# Patient Record
Sex: FEMALE | Race: WHITE | NOT HISPANIC OR LATINO | URBAN - METROPOLITAN AREA
[De-identification: names, ages, dates, MRNs, and addresses within clinical notes are randomized per-mention and may not be internally consistent; named-entity substitution may affect disease eponyms.]

---

## 2020-08-16 ENCOUNTER — OUTPATIENT (OUTPATIENT)
Dept: OUTPATIENT SERVICES | Facility: HOSPITAL | Age: 70
LOS: 1 days | Discharge: HOME | End: 2020-08-16

## 2020-08-16 DIAGNOSIS — Z11.59 ENCOUNTER FOR SCREENING FOR OTHER VIRAL DISEASES: ICD-10-CM

## 2020-08-19 ENCOUNTER — OUTPATIENT (OUTPATIENT)
Dept: OUTPATIENT SERVICES | Facility: HOSPITAL | Age: 70
LOS: 1 days | Discharge: HOME | End: 2020-08-19

## 2020-08-19 VITALS
HEIGHT: 68 IN | WEIGHT: 126.1 LBS | TEMPERATURE: 98 F | DIASTOLIC BLOOD PRESSURE: 74 MMHG | HEART RATE: 78 BPM | SYSTOLIC BLOOD PRESSURE: 146 MMHG | OXYGEN SATURATION: 96 % | RESPIRATION RATE: 17 BRPM

## 2020-08-19 VITALS — DIASTOLIC BLOOD PRESSURE: 70 MMHG | HEART RATE: 70 BPM | RESPIRATION RATE: 18 BRPM | SYSTOLIC BLOOD PRESSURE: 140 MMHG

## 2020-08-24 DIAGNOSIS — F19.19 OTHER PSYCHOACTIVE SUBSTANCE ABUSE WITH UNSPECIFIED PSYCHOACTIVE SUBSTANCE-INDUCED DISORDER: ICD-10-CM

## 2020-08-24 DIAGNOSIS — F17.210 NICOTINE DEPENDENCE, CIGARETTES, UNCOMPLICATED: ICD-10-CM

## 2020-08-24 DIAGNOSIS — H26.9 UNSPECIFIED CATARACT: ICD-10-CM

## 2020-08-24 DIAGNOSIS — H40.9 UNSPECIFIED GLAUCOMA: ICD-10-CM

## 2020-08-24 DIAGNOSIS — J44.9 CHRONIC OBSTRUCTIVE PULMONARY DISEASE, UNSPECIFIED: ICD-10-CM

## 2020-08-26 PROBLEM — H40.9 UNSPECIFIED GLAUCOMA: Chronic | Status: ACTIVE | Noted: 2020-08-19

## 2020-08-30 ENCOUNTER — OUTPATIENT (OUTPATIENT)
Dept: OUTPATIENT SERVICES | Facility: HOSPITAL | Age: 70
LOS: 1 days | Discharge: HOME | End: 2020-08-30

## 2020-08-30 DIAGNOSIS — Z11.59 ENCOUNTER FOR SCREENING FOR OTHER VIRAL DISEASES: ICD-10-CM

## 2020-09-02 ENCOUNTER — OUTPATIENT (OUTPATIENT)
Dept: OUTPATIENT SERVICES | Facility: HOSPITAL | Age: 70
LOS: 1 days | Discharge: HOME | End: 2020-09-02

## 2020-09-02 VITALS
HEART RATE: 70 BPM | TEMPERATURE: 97 F | DIASTOLIC BLOOD PRESSURE: 67 MMHG | WEIGHT: 125 LBS | SYSTOLIC BLOOD PRESSURE: 143 MMHG | OXYGEN SATURATION: 100 % | RESPIRATION RATE: 17 BRPM | HEIGHT: 68 IN

## 2020-09-02 VITALS — RESPIRATION RATE: 17 BRPM | DIASTOLIC BLOOD PRESSURE: 53 MMHG | SYSTOLIC BLOOD PRESSURE: 110 MMHG | HEART RATE: 65 BPM

## 2020-09-02 DIAGNOSIS — Z98.890 OTHER SPECIFIED POSTPROCEDURAL STATES: Chronic | ICD-10-CM

## 2020-09-02 NOTE — PRE-ANESTHESIA EVALUATION ADULT - NSANTHPMHFT_GEN_ALL_CORE
Chart reviewed, pt interviewed and examined. Chart reviewed, pt interviewed and examined.  Smoker, COPD, Mild cough +

## 2020-09-09 DIAGNOSIS — H40.9 UNSPECIFIED GLAUCOMA: ICD-10-CM

## 2020-09-09 DIAGNOSIS — H25.811 COMBINED FORMS OF AGE-RELATED CATARACT, RIGHT EYE: ICD-10-CM

## 2020-09-09 DIAGNOSIS — J44.9 CHRONIC OBSTRUCTIVE PULMONARY DISEASE, UNSPECIFIED: ICD-10-CM

## 2021-11-26 ENCOUNTER — OUTPATIENT (OUTPATIENT)
Dept: OUTPATIENT SERVICES | Facility: HOSPITAL | Age: 71
LOS: 1 days | Discharge: HOME | End: 2021-11-26
Payer: COMMERCIAL

## 2021-11-26 DIAGNOSIS — J06.9 ACUTE UPPER RESPIRATORY INFECTION, UNSPECIFIED: ICD-10-CM

## 2021-11-26 DIAGNOSIS — Z98.890 OTHER SPECIFIED POSTPROCEDURAL STATES: Chronic | ICD-10-CM

## 2021-11-26 PROCEDURE — 71045 X-RAY EXAM CHEST 1 VIEW: CPT | Mod: 26

## 2022-03-18 ENCOUNTER — EMERGENCY (EMERGENCY)
Facility: HOSPITAL | Age: 72
LOS: 0 days | Discharge: HOME | End: 2022-03-18
Attending: EMERGENCY MEDICINE | Admitting: EMERGENCY MEDICINE
Payer: COMMERCIAL

## 2022-03-18 VITALS
DIASTOLIC BLOOD PRESSURE: 74 MMHG | HEIGHT: 68 IN | RESPIRATION RATE: 17 BRPM | SYSTOLIC BLOOD PRESSURE: 174 MMHG | HEART RATE: 74 BPM | TEMPERATURE: 96 F | OXYGEN SATURATION: 97 %

## 2022-03-18 DIAGNOSIS — S39.012A STRAIN OF MUSCLE, FASCIA AND TENDON OF LOWER BACK, INITIAL ENCOUNTER: ICD-10-CM
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
## 2022-03-18 DIAGNOSIS — R10.32 LEFT LOWER QUADRANT PAIN: ICD-10-CM

## 2022-03-18 DIAGNOSIS — Z88.8 ALLERGY STATUS TO OTHER DRUGS, MEDICAMENTS AND BIOLOGICAL SUBSTANCES STATUS: ICD-10-CM

## 2022-03-18 DIAGNOSIS — F17.210 NICOTINE DEPENDENCE, CIGARETTES, UNCOMPLICATED: ICD-10-CM

## 2022-03-18 DIAGNOSIS — S76.212A STRAIN OF ADDUCTOR MUSCLE, FASCIA AND TENDON OF LEFT THIGH, INITIAL ENCOUNTER: ICD-10-CM
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
## 2022-03-18 DIAGNOSIS — Z98.890 OTHER SPECIFIED POSTPROCEDURAL STATES: Chronic | ICD-10-CM

## 2022-03-18 DIAGNOSIS — S76.012A STRAIN OF MUSCLE, FASCIA AND TENDON OF LEFT HIP, INITIAL ENCOUNTER: ICD-10-CM

## 2022-03-18 PROCEDURE — 73502 X-RAY EXAM HIP UNI 2-3 VIEWS: CPT | Mod: 26,LT

## 2022-03-18 PROCEDURE — 99284 EMERGENCY DEPT VISIT MOD MDM: CPT

## 2022-03-18 RX ORDER — LIDOCAINE 4 G/100G
1 CREAM TOPICAL ONCE
Refills: 0 | Status: COMPLETED | OUTPATIENT
Start: 2022-03-18 | End: 2022-03-18

## 2022-03-18 RX ORDER — LIDOCAINE 4 G/100G
1 CREAM TOPICAL
Qty: 10 | Refills: 0
Start: 2022-03-18 | End: 2022-03-22

## 2022-03-18 RX ORDER — METHOCARBAMOL 500 MG/1
1 TABLET, FILM COATED ORAL
Qty: 9 | Refills: 0
Start: 2022-03-18 | End: 2022-03-20

## 2022-03-18 RX ORDER — METHOCARBAMOL 500 MG/1
1500 TABLET, FILM COATED ORAL ONCE
Refills: 0 | Status: DISCONTINUED | OUTPATIENT
Start: 2022-03-18 | End: 2022-03-18

## 2022-03-18 RX ORDER — KETOROLAC TROMETHAMINE 30 MG/ML
30 SYRINGE (ML) INJECTION ONCE
Refills: 0 | Status: DISCONTINUED | OUTPATIENT
Start: 2022-03-18 | End: 2022-03-18

## 2022-03-18 RX ADMIN — LIDOCAINE 1 PATCH: 4 CREAM TOPICAL at 12:28

## 2022-03-18 RX ADMIN — Medication 30 MILLIGRAM(S): at 11:45

## 2022-03-18 RX ADMIN — Medication 30 MILLIGRAM(S): at 11:28

## 2022-03-18 NOTE — ED PROVIDER NOTE - NS ED ROS FT
Constitutional: (-) fever  Eyes/ENT: (-) visual changes   Cardiovascular: (-) chest pain, (-) syncope  Respiratory: (-) cough, (-) shortness of breath  Gastrointestinal: (-) vomiting, (-) diarrhea  Genitourinary: (-) dysuria, (-) hesitancy, (-) frequency   Musculoskeletal: (-) neck pain, (-) back pain, (-) joint pain  Integumentary: (-) rash, (-) edema  Neurological: (-) headache, (-) altered mental status  Allergic/Immunologic: (-) pruritus Constitutional: (-) fever  Eyes/ENT: (-) visual changes   Cardiovascular: (-) chest pain, (-) syncope  Respiratory: (-) cough, (-) shortness of breath  Gastrointestinal: (-) vomiting, (-) diarrhea  Genitourinary: (-) dysuria, (-) hesitancy, (-) frequency   Musculoskeletal: (-) neck pain, (-) back pain, (+) L groin pain  Integumentary: (-) rash, (-) edema  Neurological: (-) headache, (-) altered mental status  Allergic/Immunologic: (-) pruritus

## 2022-03-18 NOTE — ED PROVIDER NOTE - CLINICAL SUMMARY MEDICAL DECISION MAKING FREE TEXT BOX
73 yo F with L groin pain that started after bending over to  a paper. Pain started acutely and has persisted, exacerbated by ambulation better with rest. Exam as documented. FROM and NVI. XR negative Clinically with strain. Stable at [place].

## 2022-03-18 NOTE — ED PROVIDER NOTE - PATIENT PORTAL LINK FT
You can access the FollowMyHealth Patient Portal offered by [facility] by registering at the following website: [email or web address]. By joining [employer]’s FollowMyHealth portal, you will also be able to view your health information using other applications (apps) compatible with our system.

## 2022-03-18 NOTE — ED PROVIDER NOTE - OBJECTIVE STATEMENT
73 yo female with no pertinent pmh presents c/o L groin pain. pt states the pain started about 2hr prior to arrival after bending over to  a towel. pt denies any trauma/falls. pt describes the pain as sharp in nature that is non-radiating and aggravating with ambulation. denies any other symptoms including fevers, chill, headache, recent illness/travel, cough, urinary retention/incontinence, saddle anesthesias, abdominal pain, chest pain, or SOB.

## 2022-03-18 NOTE — ED PROVIDER NOTE - NS ED ATTENDING STATEMENT MOD
This was a shared visit with the MARTI. I reviewed and verified the documentation and independently performed the documented:

## 2022-03-18 NOTE — ED PROVIDER NOTE - NSFOLLOWUPINSTRUCTIONS_ED_ALL_ED_FT
Please follow up with your primary care physician within 24-72 hours and return immediately if symptoms worsen.    Muscle Pain, Adult  Muscle pain (myalgia) may be mild or severe. In most cases, the pain lasts only a short time and it goes away without treatment. It is normal to feel some muscle pain after starting a workout program. Muscles that have not been used often will be sore at first.    Muscle pain may also be caused by many other things, including:  Overuse or muscle strain, especially if you are not in shape. This is the most common cause of muscle pain.  Injury.  Bruises.  Viruses, such as the flu.  Infectious diseases.  A chronic condition that causes muscle tenderness, fatigue, and headache (fibromyalgia).  A condition, such as lupus, in which the body’s disease-fighting system attacks other organs in the body (autoimmune or rheumatologic diseases).  Certain drugs, including ACE inhibitors and statins.  To diagnose the cause of your muscle pain, your health care provider will do a physical exam and ask questions about the pain and when it began. If you have not had muscle pain for very long, your health care provider may want to wait before doing much testing. If your muscle pain has lasted a long time, your health care provider may want to run tests right away. In some cases, this may include tests to rule out certain conditions or illnesses.    Treatment for muscle pain depends on the cause. Home care is often enough to relieve muscle pain. Your health care provider may also prescribe anti-inflammatory medicine.    Follow these instructions at home:  Activity     If overuse is causing your muscle pain:  Slow down your activities until the pain goes away.  Do regular, gentle exercises if you are not usually active.  Warm up before exercising. Stretch before and after exercising. This can help lower the risk of muscle pain.  Do not continue working out if the pain is very bad. Bad pain could mean that you have injured a muscle.  Managing pain and discomfort     Image   If directed, apply ice to the sore muscle:  Put ice in a plastic bag.  Place a towel between your skin and the bag.  Leave the ice on for 20 minutes, 2–3 times a day.  You may also alternate between applying ice and applying heat as told by your health care provider. To apply heat, use the heat source that your health care provider recommends, such as a moist heat pack or a heating pad.  Place a towel between your skin and the heat source.  Leave the heat on for 20–30 minutes.  Remove the heat if your skin turns bright red. This is especially important if you are unable to feel pain, heat, or cold. You may have a greater risk of getting burned.  Medicines     Take over-the-counter and prescription medicines only as told by your health care provider.  Do not drive or use heavy machinery while taking prescription pain medicine.  Contact a health care provider if:  Your muscle pain gets worse and medicines do not help.  You have muscle pain that lasts longer than 3 days.  You have a rash or fever along with muscle pain.  You have muscle pain after a tick bite.  You have muscle pain while working out, even though you are in good physical condition.  You have redness, soreness, or swelling along with muscle pain.  You have muscle pain after starting a new medicine or changing the dose of a medicine.  Get help right away if:  You have trouble breathing.  You have trouble swallowing.  You have muscle pain along with a stiff neck, fever, and vomiting.  You have severe muscle weakness or cannot move part of your body.  This information is not intended to replace advice given to you by your health care provider. Make sure you discuss any questions you have with your health care provider.    Hip Pain    Your hip is the joint between your upper legs and your lower pelvis. The bones, cartilage, tendons, and muscles of your hip joint perform a lot of work each day supporting your body weight and allowing you to move around.    Hip pain can range from a minor ache to severe pain in one or both of your hips. Pain may be felt on the inside of the hip joint near the groin, or the outside near the buttocks and upper thigh. You may have swelling or stiffness as well.     HOME CARE INSTRUCTIONS  Take medicines only as directed by your health care provider.  Apply ice to the injured area:  Put ice in a plastic bag.  Place a towel between your skin and the bag.  Leave the ice on for 15–20 minutes at a time, 3–4 times a day.  Keep your leg raised (elevated) when possible to lessen swelling.  Avoid activities that cause pain.  Follow specific exercises as directed by your health care provider.  Sleep with a pillow between your legs on your most comfortable side.  Record how often you have hip pain, the location of the pain, and what it feels like.     SEEK MEDICAL CARE IF:  You are unable to put weight on your leg.  Your hip is red or swollen or very tender to touch.  Your pain or swelling continues or worsens after 1 week.  You have increasing difficulty walking.  You have a fever.    SEEK IMMEDIATE MEDICAL CARE IF:  You have fallen.  You have a sudden increase in pain and swelling in your hip.    MAKE SURE YOU:  Understand these instructions.  Will watch your condition.  Will get help right away if you are not doing well or get worse.    ADDITIONAL NOTES AND INSTRUCTIONS    Please follow up with your Primary MD in 24-48 hr.  Seek immediate medical care for any new/worsening signs or symptoms.

## 2022-03-18 NOTE — ED PROVIDER NOTE - CARE PROVIDER_API CALL
Werner Tipton (MD)  Orthopaedic Surgery  [address] [place] [address]  Phone: [phone]  Fax: [phone]  Follow Up Time: 1-3 Days

## 2022-03-18 NOTE — ED PROVIDER NOTE - ATTENDING CONTRIBUTION TO CARE
71 yo F with L groin pain that started after bending over to  a paper. Pain started acutely and has persisted, exacerbated by ambulation better with rest. Exam as documented. FROM and NVI. XR negative Clinically with strain. Stable at [place].

## 2022-03-18 NOTE — ED PROVIDER NOTE - PROGRESS NOTE DETAILS
Authored by Roseline Brenner, DO: 73 y/o F no reported PMHx presents to ED with left groin pain that started today after bending down to pick something up. Denies falls or head trauma. Pt felt "squeeze" in left lower back but currently denies back pain. Exam: well appearing, RRR, no wheezes rales or rhonchi, back without midline tenderness or stepoffs, +L lower lumbar paraspinal muscle TTP, no ecchymoses; TTP to L hip no ecchymoses, full ROM, distal pulses intact.     Plan: Pain control, XR, reassess.

## 2022-03-18 NOTE — ED PROVIDER NOTE - PHYSICAL EXAMINATION
Gen: NAD, AOx3  Head: NCAT  HEENT: PERRL, oral mucosa moist, normal conjunctiva, oropharynx clear without exudate or erythema  Lung: CTAB, no respiratory distress, no wheezing, rales, rhonchi  CV: normal s1/s2, rrr, Normal perfusion, pulses 2+ throughout  Abd: soft, NTND, no CVA tenderness  Genitourinary: no pelvic tenderness  MSK: No edema, no visible deformities, full range of motion in all 4 extremities, no spinal tenderness, no TTP of groin/hip   Neuro: CN II-XII grossly intact, No focal neurologic deficits  Skin: No rash/erythema/edema/lesions   Psych: normal affect

## 2022-09-07 ENCOUNTER — OUTPATIENT (OUTPATIENT)
Dept: OUTPATIENT SERVICES | Facility: HOSPITAL | Age: 72
LOS: 1 days | Discharge: HOME | End: 2022-09-07

## 2022-09-07 DIAGNOSIS — Z12.31 ENCOUNTER FOR SCREENING MAMMOGRAM FOR MALIGNANT NEOPLASM OF BREAST: ICD-10-CM

## 2022-09-07 DIAGNOSIS — Z98.890 OTHER SPECIFIED POSTPROCEDURAL STATES: Chronic | ICD-10-CM

## 2022-09-07 PROCEDURE — 77067 SCR MAMMO BI INCL CAD: CPT | Mod: 26

## 2022-09-07 PROCEDURE — 77063 BREAST TOMOSYNTHESIS BI: CPT | Mod: 26

## 2022-09-19 ENCOUNTER — INPATIENT (INPATIENT)
Facility: HOSPITAL | Age: 72
LOS: 1 days | Discharge: HOME | End: 2022-09-21
Attending: HOSPITALIST | Admitting: HOSPITALIST

## 2022-09-19 VITALS
HEIGHT: 68 IN | TEMPERATURE: 97 F | DIASTOLIC BLOOD PRESSURE: 71 MMHG | RESPIRATION RATE: 17 BRPM | OXYGEN SATURATION: 98 % | HEART RATE: 71 BPM | SYSTOLIC BLOOD PRESSURE: 146 MMHG | WEIGHT: 119.93 LBS

## 2022-09-19 DIAGNOSIS — Z98.890 OTHER SPECIFIED POSTPROCEDURAL STATES: Chronic | ICD-10-CM

## 2022-09-19 LAB
ALBUMIN SERPL ELPH-MCNC: 4.5 G/DL — SIGNIFICANT CHANGE UP (ref 3.5–5.2)
ALP SERPL-CCNC: 87 U/L — SIGNIFICANT CHANGE UP (ref 30–115)
ALT FLD-CCNC: 11 U/L — SIGNIFICANT CHANGE UP (ref 0–41)
ANION GAP SERPL CALC-SCNC: 10 MMOL/L — SIGNIFICANT CHANGE UP (ref 7–14)
AST SERPL-CCNC: 18 U/L — SIGNIFICANT CHANGE UP (ref 0–41)
BASOPHILS # BLD AUTO: 0.06 K/UL — SIGNIFICANT CHANGE UP (ref 0–0.2)
BASOPHILS NFR BLD AUTO: 0.9 % — SIGNIFICANT CHANGE UP (ref 0–1)
BILIRUB SERPL-MCNC: 0.3 MG/DL — SIGNIFICANT CHANGE UP (ref 0.2–1.2)
BUN SERPL-MCNC: 17 MG/DL — SIGNIFICANT CHANGE UP (ref 10–20)
CALCIUM SERPL-MCNC: 9.2 MG/DL — SIGNIFICANT CHANGE UP (ref 8.4–10.4)
CHLORIDE SERPL-SCNC: 106 MMOL/L — SIGNIFICANT CHANGE UP (ref 98–110)
CO2 SERPL-SCNC: 25 MMOL/L — SIGNIFICANT CHANGE UP (ref 17–32)
CREAT SERPL-MCNC: 0.8 MG/DL — SIGNIFICANT CHANGE UP (ref 0.7–1.5)
EGFR: 79 ML/MIN/1.73M2 — SIGNIFICANT CHANGE UP
EOSINOPHIL # BLD AUTO: 0.13 K/UL — SIGNIFICANT CHANGE UP (ref 0–0.7)
EOSINOPHIL NFR BLD AUTO: 1.9 % — SIGNIFICANT CHANGE UP (ref 0–8)
GLUCOSE SERPL-MCNC: 84 MG/DL — SIGNIFICANT CHANGE UP (ref 70–99)
HCT VFR BLD CALC: 42.1 % — SIGNIFICANT CHANGE UP (ref 37–47)
HGB BLD-MCNC: 14.1 G/DL — SIGNIFICANT CHANGE UP (ref 12–16)
IMM GRANULOCYTES NFR BLD AUTO: 0.3 % — SIGNIFICANT CHANGE UP (ref 0.1–0.3)
LYMPHOCYTES # BLD AUTO: 1.26 K/UL — SIGNIFICANT CHANGE UP (ref 1.2–3.4)
LYMPHOCYTES # BLD AUTO: 18.8 % — LOW (ref 20.5–51.1)
MAGNESIUM SERPL-MCNC: 2.1 MG/DL — SIGNIFICANT CHANGE UP (ref 1.8–2.4)
MCHC RBC-ENTMCNC: 29.9 PG — SIGNIFICANT CHANGE UP (ref 27–31)
MCHC RBC-ENTMCNC: 33.5 G/DL — SIGNIFICANT CHANGE UP (ref 32–37)
MCV RBC AUTO: 89.4 FL — SIGNIFICANT CHANGE UP (ref 81–99)
MONOCYTES # BLD AUTO: 0.75 K/UL — HIGH (ref 0.1–0.6)
MONOCYTES NFR BLD AUTO: 11.2 % — HIGH (ref 1.7–9.3)
NEUTROPHILS # BLD AUTO: 4.48 K/UL — SIGNIFICANT CHANGE UP (ref 1.4–6.5)
NEUTROPHILS NFR BLD AUTO: 66.9 % — SIGNIFICANT CHANGE UP (ref 42.2–75.2)
NRBC # BLD: 0 /100 WBCS — SIGNIFICANT CHANGE UP (ref 0–0)
PLATELET # BLD AUTO: 218 K/UL — SIGNIFICANT CHANGE UP (ref 130–400)
POTASSIUM SERPL-MCNC: 4.2 MMOL/L — SIGNIFICANT CHANGE UP (ref 3.5–5)
POTASSIUM SERPL-SCNC: 4.2 MMOL/L — SIGNIFICANT CHANGE UP (ref 3.5–5)
PROT SERPL-MCNC: 6.3 G/DL — SIGNIFICANT CHANGE UP (ref 6–8)
RBC # BLD: 4.71 M/UL — SIGNIFICANT CHANGE UP (ref 4.2–5.4)
RBC # FLD: 13.3 % — SIGNIFICANT CHANGE UP (ref 11.5–14.5)
SARS-COV-2 RNA SPEC QL NAA+PROBE: SIGNIFICANT CHANGE UP
SODIUM SERPL-SCNC: 141 MMOL/L — SIGNIFICANT CHANGE UP (ref 135–146)
TROPONIN T SERPL-MCNC: 0.01 NG/ML — SIGNIFICANT CHANGE UP
TROPONIN T SERPL-MCNC: <0.01 NG/ML — SIGNIFICANT CHANGE UP
WBC # BLD: 6.7 K/UL — SIGNIFICANT CHANGE UP (ref 4.8–10.8)
WBC # FLD AUTO: 6.7 K/UL — SIGNIFICANT CHANGE UP (ref 4.8–10.8)

## 2022-09-19 PROCEDURE — 71045 X-RAY EXAM CHEST 1 VIEW: CPT | Mod: 26

## 2022-09-19 PROCEDURE — 75574 CT ANGIO HRT W/3D IMAGE: CPT | Mod: 26,MA

## 2022-09-19 PROCEDURE — 99236 HOSP IP/OBS SAME DATE HI 85: CPT

## 2022-09-19 PROCEDURE — 93010 ELECTROCARDIOGRAM REPORT: CPT | Mod: 76

## 2022-09-19 RX ORDER — ASPIRIN/CALCIUM CARB/MAGNESIUM 324 MG
162 TABLET ORAL ONCE
Refills: 0 | Status: COMPLETED | OUTPATIENT
Start: 2022-09-19 | End: 2022-09-19

## 2022-09-19 RX ORDER — METOPROLOL TARTRATE 50 MG
100 TABLET ORAL ONCE
Refills: 0 | Status: DISCONTINUED | OUTPATIENT
Start: 2022-09-19 | End: 2022-09-19

## 2022-09-19 RX ORDER — METOPROLOL TARTRATE 50 MG
50 TABLET ORAL ONCE
Refills: 0 | Status: COMPLETED | OUTPATIENT
Start: 2022-09-19 | End: 2022-09-19

## 2022-09-19 RX ADMIN — Medication 162 MILLIGRAM(S): at 10:34

## 2022-09-19 RX ADMIN — Medication 50 MILLIGRAM(S): at 10:44

## 2022-09-19 NOTE — ED ADULT NURSE NOTE - OBJECTIVE STATEMENT
pt reports midsternal chest pain on and off from about one month. Pain got worse around 430 am but has subsided within 30 min of arrival

## 2022-09-19 NOTE — ED PROVIDER NOTE - OBJECTIVE STATEMENT
72-year-old female with no past medical history positive smoker presenting with midsternal chest pain radiating to neck and bilateral shoulders that started this morning around 4:30 AM.  Patient describes pain as burning and pressure-like lasting a few hours.  Patient states that she took 2 aspirin prior to arrival. Patient has been having this pain intermittently over the last month.  The last time she had this pain was Friday and was seen by her primary care doctor and had an EKG done and was sent home on [facility].  Patient has not had any cardiac testing recently. No recent travel, recent surgeries, leg pain, leg swelling, hormonal use, pleuritic chest pain, or history of blood clots. No sob, fever, chills, abdominal pain, nausea, vomiting, diarrhea, back pain, urinary symptoms, headache, dizziness, paresthesias, or weakness.

## 2022-09-19 NOTE — ED PROVIDER NOTE - WR ORDER ID 1
Detail Level: Zone
Initiate Treatment: Clobetasol cream twice daily to as needed. \\nWash clothes that was worn outdoors.\\nBenadryl at night.\\nAllegra in the morning.
[id number]

## 2022-09-19 NOTE — ED CDU PROVIDER INITIAL DAY NOTE - PHYSICAL EXAMINATION
CONST: Well appearing in NAD  EYES: PERRL, EOMI, Sclera and conjunctiva clear.   CARD: Normal S1 S2; Normal rate and rhythm  RESP: Equal BS B/L, No wheezes, rhonchi or rales. No distress  GI: Soft, non-tender, non-distended.  MS: Normal ROM in all extremities. No midline spinal tenderness.  SKIN: Warm, dry, no acute rashes. Good turgor  NEURO: A&Ox3, No focal deficits. Strength 5/5 with no sensory deficits. Steady gait

## 2022-09-19 NOTE — ED CDU PROVIDER INITIAL DAY NOTE - OBJECTIVE STATEMENT
73yo female active cigarette smoker presented to ED c/o mid-sternal chest burning radiating to throat and both shoulders, without any specific aggravating or relieving factors, not at a specific time of day. Pt was seen by her PMD 71yo female active cigarette smoker presented to ED c/o mid-sternal chest burning radiating to throat and both shoulders, without any specific aggravating or relieving factors, not at a specific time of day. Pt was seen by her PMD and encouraged to start Pecid of which she has done for the past 3 days without relief. She has not had any prior cardiac workup. No FHx CAD 73yo female active cigarette smoker presented to ED c/o mid-sternal chest burning radiating to throat and both shoulders, without any specific aggravating or relieving factors, not at a specific time of day for the past 3wks-1mth. Pt was seen by her PMD and encouraged to start Pepcid of which she has done for the past 3 days without relief. She has not had any prior cardiac workup. No FHx CAD

## 2022-09-19 NOTE — ED PROVIDER NOTE - NS ED ROS FT
Review of Systems:  	•	CONSTITUTIONAL - no fever, no diaphoresis, no chills  	•	SKIN - no rash  	•	HEMATOLOGIC - no bleeding, no bruising  	•	EYES - no eye pain, no blurry vision  	•	ENT - no congestion  	•	RESPIRATORY - no shortness of breath, no cough  	•	CARDIAC - + chest pain, no palpitations  	•	GI - no abd pain, no nausea, no vomiting, no diarrhea, no constipation  	•	GENITO-URINARY - no dysuria; no hematuria, no increased urinary frequency  	•	MUSCULOSKELETAL - +neck pain, +shoulder pain, no swelling, no redness  	•	NEUROLOGIC - no weakness, no headache, no paresthesias, no LOC  	•	PSYCH - no anxiety, no depression  	All other ROS are negative except as documented in HPI.

## 2022-09-19 NOTE — ED CDU PROVIDER DISPOSITION NOTE - CLINICAL COURSE
pt presented to ed for eval of chest pain. pt placed in edou under  chest pain protocol. pt with workup including labs wnl, including 2 sets of negative cardiac enzymes, 2 ekg with no ischemic changes, normal cxray, CCTA Cad Rad 4. The patient was admitted for further eval.

## 2022-09-19 NOTE — ED PROVIDER NOTE - CLINICAL SUMMARY MEDICAL DECISION MAKING FREE TEXT BOX
72-year-old female with history of spinal stenosis, smoker, presents with intermittent chest pressure x1 month, radiates to bilateral shoulders and neck intermittently, no exacerbating/alleviating factors, nonexertional, nonpleuritic. Seen by PMD and started on Prilosec, without improvement. Denies all other symptoms including shortness of breath, vomiting, dizziness, diaphoresis, cough, fever, leg pain or swelling, recent long distance travel. On exam, afebrile, hemodynamically stable, saturating well, NAD, well appearing, sitting comfortably in bed, no WOB, speaking full sentences, head NCAT, EOMI grossly, anicteric, MMM, no JVD, RRR, nml S1/S2, no m/r/g, lungs CTAB, no w/r/r, abd soft, NT, ND, nml BS, no rebound or guarding, AAO, CN's 3-12 grossly intact, RODRIGUEZ spontaneously, no leg cyanosis or edema, 2+ symmetric radial pulses, skin warm, well perfused. Character low suspicion for dissection and no murmur or pulse asymmetry. Character low suspicion for PE and no tachycardia, hypoxia, or e/o DVT or acute risk factors for this. No e/o PNA, PTX, or fluid overload on exam or CXR. Character of some concern for ACS, ECG/trop unremarkable. NAD, well appearing. Given ASA. Sent to obs for ACS w/u.

## 2022-09-20 LAB
A1C WITH ESTIMATED AVERAGE GLUCOSE RESULT: 5.6 % — SIGNIFICANT CHANGE UP (ref 4–5.6)
ALBUMIN SERPL ELPH-MCNC: 4.5 G/DL — SIGNIFICANT CHANGE UP (ref 3.5–5.2)
ALP SERPL-CCNC: 92 U/L — SIGNIFICANT CHANGE UP (ref 30–115)
ALT FLD-CCNC: 11 U/L — SIGNIFICANT CHANGE UP (ref 0–41)
ANION GAP SERPL CALC-SCNC: 11 MMOL/L — SIGNIFICANT CHANGE UP (ref 7–14)
AST SERPL-CCNC: 20 U/L — SIGNIFICANT CHANGE UP (ref 0–41)
BASOPHILS # BLD AUTO: 0.05 K/UL — SIGNIFICANT CHANGE UP (ref 0–0.2)
BASOPHILS NFR BLD AUTO: 0.8 % — SIGNIFICANT CHANGE UP (ref 0–1)
BILIRUB SERPL-MCNC: 0.5 MG/DL — SIGNIFICANT CHANGE UP (ref 0.2–1.2)
BUN SERPL-MCNC: 19 MG/DL — SIGNIFICANT CHANGE UP (ref 10–20)
CALCIUM SERPL-MCNC: 9.1 MG/DL — SIGNIFICANT CHANGE UP (ref 8.4–10.5)
CHLORIDE SERPL-SCNC: 107 MMOL/L — SIGNIFICANT CHANGE UP (ref 98–110)
CHOLEST SERPL-MCNC: 244 MG/DL — HIGH
CO2 SERPL-SCNC: 27 MMOL/L — SIGNIFICANT CHANGE UP (ref 17–32)
CREAT SERPL-MCNC: 0.8 MG/DL — SIGNIFICANT CHANGE UP (ref 0.7–1.5)
EGFR: 79 ML/MIN/1.73M2 — SIGNIFICANT CHANGE UP
EOSINOPHIL # BLD AUTO: 0.2 K/UL — SIGNIFICANT CHANGE UP (ref 0–0.7)
EOSINOPHIL NFR BLD AUTO: 3.1 % — SIGNIFICANT CHANGE UP (ref 0–8)
ESTIMATED AVERAGE GLUCOSE: 114 MG/DL — SIGNIFICANT CHANGE UP (ref 68–114)
GLUCOSE SERPL-MCNC: 93 MG/DL — SIGNIFICANT CHANGE UP (ref 70–99)
HCT VFR BLD CALC: 43.5 % — SIGNIFICANT CHANGE UP (ref 37–47)
HCV AB S/CO SERPL IA: 0.04 COI — SIGNIFICANT CHANGE UP
HCV AB SERPL-IMP: SIGNIFICANT CHANGE UP
HDLC SERPL-MCNC: 69 MG/DL — SIGNIFICANT CHANGE UP
HGB BLD-MCNC: 14.7 G/DL — SIGNIFICANT CHANGE UP (ref 12–16)
IMM GRANULOCYTES NFR BLD AUTO: 0.3 % — SIGNIFICANT CHANGE UP (ref 0.1–0.3)
LIPID PNL WITH DIRECT LDL SERPL: 153 MG/DL — HIGH
LYMPHOCYTES # BLD AUTO: 1.7 K/UL — SIGNIFICANT CHANGE UP (ref 1.2–3.4)
LYMPHOCYTES # BLD AUTO: 26.5 % — SIGNIFICANT CHANGE UP (ref 20.5–51.1)
MAGNESIUM SERPL-MCNC: 2.3 MG/DL — SIGNIFICANT CHANGE UP (ref 1.8–2.4)
MCHC RBC-ENTMCNC: 30.2 PG — SIGNIFICANT CHANGE UP (ref 27–31)
MCHC RBC-ENTMCNC: 33.8 G/DL — SIGNIFICANT CHANGE UP (ref 32–37)
MCV RBC AUTO: 89.5 FL — SIGNIFICANT CHANGE UP (ref 81–99)
MONOCYTES # BLD AUTO: 0.68 K/UL — HIGH (ref 0.1–0.6)
MONOCYTES NFR BLD AUTO: 10.6 % — HIGH (ref 1.7–9.3)
NEUTROPHILS # BLD AUTO: 3.77 K/UL — SIGNIFICANT CHANGE UP (ref 1.4–6.5)
NEUTROPHILS NFR BLD AUTO: 58.7 % — SIGNIFICANT CHANGE UP (ref 42.2–75.2)
NON HDL CHOLESTEROL: 175 MG/DL — HIGH
NRBC # BLD: 0 /100 WBCS — SIGNIFICANT CHANGE UP (ref 0–0)
PLATELET # BLD AUTO: 219 K/UL — SIGNIFICANT CHANGE UP (ref 130–400)
POTASSIUM SERPL-MCNC: 4.5 MMOL/L — SIGNIFICANT CHANGE UP (ref 3.5–5)
POTASSIUM SERPL-SCNC: 4.5 MMOL/L — SIGNIFICANT CHANGE UP (ref 3.5–5)
PROT SERPL-MCNC: 6.6 G/DL — SIGNIFICANT CHANGE UP (ref 6–8)
RBC # BLD: 4.86 M/UL — SIGNIFICANT CHANGE UP (ref 4.2–5.4)
RBC # FLD: 13.6 % — SIGNIFICANT CHANGE UP (ref 11.5–14.5)
SODIUM SERPL-SCNC: 145 MMOL/L — SIGNIFICANT CHANGE UP (ref 135–146)
TRIGL SERPL-MCNC: 106 MG/DL — SIGNIFICANT CHANGE UP
TSH SERPL-MCNC: 1.52 UIU/ML — SIGNIFICANT CHANGE UP (ref 0.27–4.2)
WBC # BLD: 6.42 K/UL — SIGNIFICANT CHANGE UP (ref 4.8–10.8)
WBC # FLD AUTO: 6.42 K/UL — SIGNIFICANT CHANGE UP (ref 4.8–10.8)

## 2022-09-20 PROCEDURE — 93010 ELECTROCARDIOGRAM REPORT: CPT

## 2022-09-20 PROCEDURE — 99222 1ST HOSP IP/OBS MODERATE 55: CPT

## 2022-09-20 PROCEDURE — 99406 BEHAV CHNG SMOKING 3-10 MIN: CPT

## 2022-09-20 PROCEDURE — 92978 ENDOLUMINL IVUS OCT C 1ST: CPT | Mod: 26,RC,XU

## 2022-09-20 PROCEDURE — 93458 L HRT ARTERY/VENTRICLE ANGIO: CPT | Mod: 26

## 2022-09-20 PROCEDURE — 93306 TTE W/DOPPLER COMPLETE: CPT | Mod: 26

## 2022-09-20 PROCEDURE — 92928 PRQ TCAT PLMT NTRAC ST 1 LES: CPT | Mod: RC,XU

## 2022-09-20 PROCEDURE — 99223 1ST HOSP IP/OBS HIGH 75: CPT

## 2022-09-20 RX ORDER — ASPIRIN/CALCIUM CARB/MAGNESIUM 324 MG
81 TABLET ORAL DAILY
Refills: 0 | Status: DISCONTINUED | OUTPATIENT
Start: 2022-09-20 | End: 2022-09-21

## 2022-09-20 RX ORDER — TICAGRELOR 90 MG/1
90 TABLET ORAL EVERY 12 HOURS
Refills: 0 | Status: DISCONTINUED | OUTPATIENT
Start: 2022-09-20 | End: 2022-09-21

## 2022-09-20 RX ORDER — TIMOLOL 0.5 %
1 DROPS OPHTHALMIC (EYE) EVERY 12 HOURS
Refills: 0 | Status: DISCONTINUED | OUTPATIENT
Start: 2022-09-20 | End: 2022-09-21

## 2022-09-20 RX ORDER — DORZOLAMIDE HYDROCHLORIDE 20 MG/ML
1 SOLUTION/ DROPS OPHTHALMIC
Refills: 0 | Status: DISCONTINUED | OUTPATIENT
Start: 2022-09-20 | End: 2022-09-21

## 2022-09-20 RX ORDER — ENOXAPARIN SODIUM 100 MG/ML
40 INJECTION SUBCUTANEOUS EVERY 24 HOURS
Refills: 0 | Status: DISCONTINUED | OUTPATIENT
Start: 2022-09-20 | End: 2022-09-21

## 2022-09-20 RX ORDER — NICOTINE POLACRILEX 2 MG
1 GUM BUCCAL DAILY
Refills: 0 | Status: DISCONTINUED | OUTPATIENT
Start: 2022-09-20 | End: 2022-09-21

## 2022-09-20 RX ORDER — LATANOPROST 0.05 MG/ML
1 SOLUTION/ DROPS OPHTHALMIC; TOPICAL AT BEDTIME
Refills: 0 | Status: DISCONTINUED | OUTPATIENT
Start: 2022-09-20 | End: 2022-09-21

## 2022-09-20 RX ORDER — DORZOLAMIDE HYDROCHLORIDE TIMOLOL MALEATE 20; 5 MG/ML; MG/ML
1 SOLUTION/ DROPS OPHTHALMIC EVERY 12 HOURS
Refills: 0 | Status: DISCONTINUED | OUTPATIENT
Start: 2022-09-20 | End: 2022-09-20

## 2022-09-20 RX ORDER — TIMOLOL 0.5 %
1 DROPS OPHTHALMIC (EYE) EVERY 12 HOURS
Refills: 0 | Status: DISCONTINUED | OUTPATIENT
Start: 2022-09-20 | End: 2022-09-20

## 2022-09-20 RX ORDER — DORZOLAMIDE HYDROCHLORIDE 20 MG/ML
1 SOLUTION/ DROPS OPHTHALMIC
Refills: 0 | Status: DISCONTINUED | OUTPATIENT
Start: 2022-09-20 | End: 2022-09-20

## 2022-09-20 RX ORDER — ACETAMINOPHEN 500 MG
650 TABLET ORAL EVERY 6 HOURS
Refills: 0 | Status: DISCONTINUED | OUTPATIENT
Start: 2022-09-20 | End: 2022-09-21

## 2022-09-20 RX ORDER — SODIUM CHLORIDE 9 MG/ML
1000 INJECTION INTRAMUSCULAR; INTRAVENOUS; SUBCUTANEOUS
Refills: 0 | Status: DISCONTINUED | OUTPATIENT
Start: 2022-09-20 | End: 2022-09-21

## 2022-09-20 RX ORDER — CHLORHEXIDINE GLUCONATE 213 G/1000ML
1 SOLUTION TOPICAL
Refills: 0 | Status: DISCONTINUED | OUTPATIENT
Start: 2022-09-20 | End: 2022-09-21

## 2022-09-20 RX ORDER — ATORVASTATIN CALCIUM 80 MG/1
80 TABLET, FILM COATED ORAL AT BEDTIME
Refills: 0 | Status: DISCONTINUED | OUTPATIENT
Start: 2022-09-20 | End: 2022-09-21

## 2022-09-20 RX ADMIN — LATANOPROST 1 DROP(S): 0.05 SOLUTION/ DROPS OPHTHALMIC; TOPICAL at 21:26

## 2022-09-20 RX ADMIN — ATORVASTATIN CALCIUM 80 MILLIGRAM(S): 80 TABLET, FILM COATED ORAL at 21:26

## 2022-09-20 RX ADMIN — Medication 81 MILLIGRAM(S): at 12:09

## 2022-09-20 RX ADMIN — ATORVASTATIN CALCIUM 80 MILLIGRAM(S): 80 TABLET, FILM COATED ORAL at 02:20

## 2022-09-20 RX ADMIN — SODIUM CHLORIDE 150 MILLILITER(S): 9 INJECTION INTRAMUSCULAR; INTRAVENOUS; SUBCUTANEOUS at 19:56

## 2022-09-20 NOTE — CONSULT NOTE ADULT - SUBJECTIVE AND OBJECTIVE BOX
Cardiologist:    HPI:  73yo F w/ pmhx of HLD, tobacco use disorder presents to ED for chest pain. Described as substernal radiating to b/l shoulders and throat. CP was burning in nature but throat pain is not. PCP was more concerned about GERD and prescribed pepcid for 3 days without relief. This has been going on for the past month and CP happens randomly without any provocative factors. Can happen with either rest or exertion. Denies palpable or pleuritic chest pain. Was prescribed atorvastatin but was waiting for insurance approval so did not take it yet. Denies palpitations, SOB, diaphoresis, N/V. Patient took two renuka aspirins for her neck pain due to cervical stenosis. Got an additional two baby aspirins in the ED. Placed in obs for CCTA which was positive. CP resolved after lopressor. Admitted to medicine.     ED course:   vitals: /71, HR 71, T 96.8F, O2 98% RA  labs: trop neg x 2   imaging:   - ECG: HR 67, no signficant signs of ischemia  - CCTA: Focal severe narrowing within the mid RCA. Mild to moderate LCx ostial narrowing. Mixed calcified and noncalcified plaque proximal LAD resulting in mild narrowing.  given: asa 162, lopressor 50mg (20 Sep 2022 00:09)      PAST MEDICAL & SURGICAL HISTORY  Glaucoma of both eyes, unspecified glaucoma type    Hyperlipidemia    History of surgery  LEFT EYE CATARACT EXTRACTION WITH LENS IMPLANT        FAMILY HISTORY:  FAMILY HISTORY:  No pertinent family history in first degree relatives    [ ] no pertinent family history of premature cardiovascular disease in first degree relatives.  Mother:   Father:   Siblings:     SOCIAL HISTORY:  []smoker  []Alcohol  []Drug    ALLERGIES:  No Known Allergies    MEDICATIONS:  MEDICATIONS  (STANDING):  aspirin  chewable 81 milliGRAM(s) Oral daily  atorvastatin 80 milliGRAM(s) Oral at bedtime  chlorhexidine 2% Cloths 1 Application(s) Topical <User Schedule>  dorzolamide 2% Ophthalmic Solution 1 Drop(s) Both EYES <User Schedule>  enoxaparin Injectable 40 milliGRAM(s) SubCutaneous every 24 hours  latanoprost 0.005% Ophthalmic Solution 1 Drop(s) Both EYES at bedtime  nicotine -  14 mG/24Hr(s) Patch 1 Patch Transdermal daily  timolol 0.5% Solution 1 Drop(s) Both EYES every 12 hours    MEDICATIONS  (PRN):  acetaminophen     Tablet .. 650 milliGRAM(s) Oral every 6 hours PRN Temp greater or equal to 38C (100.4F), Mild Pain (1 - 3)      HOME MEDICATIONS:  Home Medications:  dorzolamide-timolol 2.23%-0.68% ophthalmic solution: 1 drop(s) to each affected eye 2 times a day (20 Sep 2022 00:29)  latanoprost 0.005% ophthalmic solution: 1 drop(s) to each affected eye once a day (in the evening) (20 Sep 2022 00:29)      VITALS:   T(F): 97.3 (09-20 @ 06:30), Max: 98.4 (09-20 @ 00:14)  HR: 60 (09-20 @ 06:30) (59 - 71)  BP: 125/59 (09-20 @ 06:30) (113/56 - 146/71)  BP(mean): 85 (09-20 @ 06:30) (85 - 85)  RR: 20 (09-20 @ 06:30) (17 - 20)  SpO2: 98% (09-20 @ 06:30) (96% - 98%)    I&O's Summary      REVIEW OF SYSTEMS:    Negative except as mentioned in HPI    PHYSICAL EXAM:  NEURO: Awake , alert and oriented  GEN: Not in acute distress  NECK: No JVD  LUNGS: Clear to auscultation bilaterally   CARDIOVASCULAR: S1/S2 present, RRR , no murmurs or rubs, no carotid bruits,  + PP bilaterally  ABD: Soft, non-tender, non-distended, +BS  EXT: No SARAH BETH  SKIN: Intact    LABS:                        14.1   6.70  )-----------( 218      ( 19 Sep 2022 07:49 )             42.1     09-19    141  |  106  |  17  ----------------------------<  84  4.2   |  25  |  0.8    Ca    9.2      19 Sep 2022 07:49  Mg     2.1     09-19    TPro  6.3  /  Alb  4.5  /  TBili  0.3  /  DBili  x   /  AST  18  /  ALT  11  /  AlkPhos  87  09-19      Troponin T, Serum: 0.01 ng/mL (09-19-22 @ 20:58)  Troponin T, Serum: <0.01 ng/mL (09-19-22 @ 07:49)    CARDIAC MARKERS ( 19 Sep 2022 20:58 )  x     / 0.01 ng/mL / x     / x     / x      CARDIAC MARKERS ( 19 Sep 2022 07:49 )  x     / <0.01 ng/mL / x     / x     / x          RADIOLOGY:  -CXR:  < from: Xray Chest 1 View-PORTABLE IMMEDIATE (Xray Chest 1 View-PORTABLE IMMEDIATE .) (09.19.22 @ 08:29) >  Impression:    Hyperaerated lungs. No focal infiltrate.    < end of copied text >    -TTE:    -CCTA:  < from: CT Angio Heart and Coronaries w/ IV Cont (09.19.22 @ 16:39) >  IMPRESSION:    Focal severe narrowing within the mid RCA.    Mild to moderate LCx ostial narrowing.    Mixed calcified and noncalcified plaque proximal LAD resulting in mild   narrowing.    The total Agatston coronary artery calcium score equals 101, which   corresponds to 72 percentile for age, gender and ethnicity.    CAD-RADS 4A.    < end of copied text >    -STRESS TEST:    -CATHETERIZATION:      ECG:  < from: 12 Lead ECG (09.19.22 @ 21:04) >  Ventricular Rate 56 BPM    Atrial Rate 56 BPM    P-R Interval 202 ms    QRS Duration 86 ms    Q-T Interval 474 ms    QTC Calculation(Bazett) 457 ms    P Axis 83 degrees    R Axis -41 degrees    T Axis -65 degrees    Diagnosis Line Sinus bradycardia  Left axis deviation  Left anterior fascicular block  T wave abnormality, consider inferior ischemia  Abnormal ECG      < end of copied text >      TELEMETRY EVENTS: sinus rhythm   Cardiologist: none    HPI:  73yo F w/ pmhx of HLD, tobacco use disorder presents to ED for chest pain. Described as substernal radiating to b/l shoulders and throat. CP was burning in nature but throat pain is not. PCP was more concerned about GERD and prescribed pepcid for 3 days without relief. This has been going on for the past month and CP happens randomly without any provocative factors. Can happen with either rest or exertion. Denies palpable or pleuritic chest pain. Was prescribed atorvastatin but was waiting for insurance approval so did not take it yet. Denies palpitations, SOB, diaphoresis, N/V. Patient took two renuka aspirins for her neck pain due to cervical stenosis. Got an additional two baby aspirins in the ED. Placed in obs for CCTA which was positive. CP resolved after lopressor. Admitted to medicine.     ED course:   vitals: /71, HR 71, T 96.8F, O2 98% RA  labs: trop neg x 2   imaging:   - ECG: HR 67, no signficant signs of ischemia  - CCTA: Focal severe narrowing within the mid RCA. Mild to moderate LCx ostial narrowing. Mixed calcified and noncalcified plaque proximal LAD resulting in mild narrowing.  given: asa 162, lopressor 50mg (20 Sep 2022 00:09)    PAST MEDICAL & SURGICAL HISTORY  Glaucoma of both eyes, unspecified glaucoma type    Hyperlipidemia    History of surgery  LEFT EYE CATARACT EXTRACTION WITH LENS IMPLANT    FAMILY HISTORY:  FAMILY HISTORY:  No pertinent family history in first degree relatives    [ ] no pertinent family history of premature cardiovascular disease in first degree relatives.  Mother:   Father:   Siblings:     SOCIAL HISTORY:  [x]smoker  []Alcohol  []Drug    ALLERGIES:  No Known Allergies    MEDICATIONS:  MEDICATIONS  (STANDING):  aspirin  chewable 81 milliGRAM(s) Oral daily  atorvastatin 80 milliGRAM(s) Oral at bedtime  chlorhexidine 2% Cloths 1 Application(s) Topical <User Schedule>  dorzolamide 2% Ophthalmic Solution 1 Drop(s) Both EYES <User Schedule>  enoxaparin Injectable 40 milliGRAM(s) SubCutaneous every 24 hours  latanoprost 0.005% Ophthalmic Solution 1 Drop(s) Both EYES at bedtime  nicotine -  14 mG/24Hr(s) Patch 1 Patch Transdermal daily  timolol 0.5% Solution 1 Drop(s) Both EYES every 12 hours    MEDICATIONS  (PRN):  acetaminophen     Tablet .. 650 milliGRAM(s) Oral every 6 hours PRN Temp greater or equal to 38C (100.4F), Mild Pain (1 - 3)    HOME MEDICATIONS:  Home Medications:  dorzolamide-timolol 2.23%-0.68% ophthalmic solution: 1 drop(s) to each affected eye 2 times a day (20 Sep 2022 00:29)  latanoprost 0.005% ophthalmic solution: 1 drop(s) to each affected eye once a day (in the evening) (20 Sep 2022 00:29)    VITALS:   T(F): 97.3 (09-20 @ 06:30), Max: 98.4 (09-20 @ 00:14)  HR: 60 (09-20 @ 06:30) (59 - 71)  BP: 125/59 (09-20 @ 06:30) (113/56 - 146/71)  BP(mean): 85 (09-20 @ 06:30) (85 - 85)  RR: 20 (09-20 @ 06:30) (17 - 20)  SpO2: 98% (09-20 @ 06:30) (96% - 98%)    REVIEW OF SYSTEMS:    Negative except as mentioned in HPI    PHYSICAL EXAM:  NEURO: Awake , alert and oriented  GEN: Not in acute distress  NECK: No JVD  LUNGS: Clear to auscultation bilaterally   CARDIOVASCULAR: S1/S2 present, RRR , no murmurs or rubs, no carotid bruits,  + PP bilaterally  ABD: Soft, non-tender, non-distended, +BS  EXT: No SARAH BETH  SKIN: Intact    LABS:                        14.1   6.70  )-----------( 218      ( 19 Sep 2022 07:49 )             42.1     09-19    141  |  106  |  17  ----------------------------<  84  4.2   |  25  |  0.8    Ca    9.2      19 Sep 2022 07:49  Mg     2.1     09-19    TPro  6.3  /  Alb  4.5  /  TBili  0.3  /  DBili  x   /  AST  18  /  ALT  11  /  AlkPhos  87  09-19      Troponin T, Serum: 0.01 ng/mL (09-19-22 @ 20:58)  Troponin T, Serum: <0.01 ng/mL (09-19-22 @ 07:49)    CARDIAC MARKERS ( 19 Sep 2022 20:58 )  x     / 0.01 ng/mL / x     / x     / x      CARDIAC MARKERS ( 19 Sep 2022 07:49 )  x     / <0.01 ng/mL / x     / x     / x        RADIOLOGY:  -CXR:  < from: Xray Chest 1 View-PORTABLE IMMEDIATE (Xray Chest 1 View-PORTABLE IMMEDIATE .) (09.19.22 @ 08:29) >  Impression:    Hyperaerated lungs. No focal infiltrate.    < end of copied text >    -TTE:    -CCTA:  < from: CT Angio Heart and Coronaries w/ IV Cont (09.19.22 @ 16:39) >  IMPRESSION:    Focal severe narrowing within the mid RCA.    Mild to moderate LCx ostial narrowing.    Mixed calcified and noncalcified plaque proximal LAD resulting in mild   narrowing.    The total Agatston coronary artery calcium score equals 101, which   corresponds to 72 percentile for age, gender and ethnicity.    CAD-RADS 4A.    < end of copied text >    -STRESS TEST:    -CATHETERIZATION:    ECG:  < from: 12 Lead ECG (09.19.22 @ 21:04) >  Ventricular Rate 56 BPM    Atrial Rate 56 BPM    P-R Interval 202 ms    QRS Duration 86 ms    Q-T Interval 474 ms    QTC Calculation(Bazett) 457 ms    P Axis 83 degrees    R Axis -41 degrees    T Axis -65 degrees    Diagnosis Line Sinus bradycardia  Left axis deviation  Left anterior fascicular block  T wave abnormality, consider inferior ischemia  Abnormal ECG      < end of copied text >      TELEMETRY EVENTS: sinus rhythm   Cardiologist: none    HPI:  71yo F w/ pmhx of HLD, tobacco use disorder presents to ED for chest pain. Described as substernal radiating to b/l shoulders and throat. CP was burning in nature but throat pain is not. PCP was more concerned about GERD and prescribed pepcid for 3 days without relief. This has been going on for the past month and CP happens randomly without any provocative factors. Can happen with either rest or exertion. Denies palpable or pleuritic chest pain. Was prescribed atorvastatin but was waiting for insurance approval so did not take it yet. Denies palpitations, SOB, diaphoresis, N/V. Patient took two renuka aspirins for her neck pain due to cervical stenosis. Got an additional two baby aspirins in the ED. Placed in obs for CCTA which was positive. CP resolved after lopressor. Admitted to medicine.     ED course:   vitals: /71, HR 71, T 96.8F, O2 98% RA  labs: trop neg x 2   imaging:   - ECG: HR 67, no signficant signs of ischemia  - CCTA: Focal severe narrowing within the mid RCA. Mild to moderate LCx ostial narrowing. Mixed calcified and noncalcified plaque proximal LAD resulting in mild narrowing.  given: asa 162, lopressor 50mg (20 Sep 2022 00:09)      PAST MEDICAL & SURGICAL HISTORY  Glaucoma of both eyes, unspecified glaucoma type    Hyperlipidemia    History of surgery  LEFT EYE CATARACT EXTRACTION WITH LENS IMPLANT    FAMILY HISTORY:  FAMILY HISTORY:  No pertinent family history in first degree relatives    [ ] no pertinent family history of premature cardiovascular disease in first degree relatives.  Mother:   Father:   Siblings:     SOCIAL HISTORY:  [x]smoker  []Alcohol  []Drug    ALLERGIES:  No Known Allergies    MEDICATIONS:  MEDICATIONS  (STANDING):  aspirin  chewable 81 milliGRAM(s) Oral daily  atorvastatin 80 milliGRAM(s) Oral at bedtime  chlorhexidine 2% Cloths 1 Application(s) Topical <User Schedule>  dorzolamide 2% Ophthalmic Solution 1 Drop(s) Both EYES <User Schedule>  enoxaparin Injectable 40 milliGRAM(s) SubCutaneous every 24 hours  latanoprost 0.005% Ophthalmic Solution 1 Drop(s) Both EYES at bedtime  nicotine -  14 mG/24Hr(s) Patch 1 Patch Transdermal daily  timolol 0.5% Solution 1 Drop(s) Both EYES every 12 hours    MEDICATIONS  (PRN):  acetaminophen     Tablet .. 650 milliGRAM(s) Oral every 6 hours PRN Temp greater or equal to 38C (100.4F), Mild Pain (1 - 3)    HOME MEDICATIONS:  Home Medications:  dorzolamide-timolol 2.23%-0.68% ophthalmic solution: 1 drop(s) to each affected eye 2 times a day (20 Sep 2022 00:29)  latanoprost 0.005% ophthalmic solution: 1 drop(s) to each affected eye once a day (in the evening) (20 Sep 2022 00:29)    VITALS:   T(F): 97.3 (09-20 @ 06:30), Max: 98.4 (09-20 @ 00:14)  HR: 60 (09-20 @ 06:30) (59 - 71)  BP: 125/59 (09-20 @ 06:30) (113/56 - 146/71)  BP(mean): 85 (09-20 @ 06:30) (85 - 85)  RR: 20 (09-20 @ 06:30) (17 - 20)  SpO2: 98% (09-20 @ 06:30) (96% - 98%)    REVIEW OF SYSTEMS:  Negative except as mentioned in HPI    PHYSICAL EXAM:  General: NAD, AAOx3  HEENT: NCAT, EOMI  Neck: supple, no JVD  CV: RRR, S1S2 nl, no murmurs, no edema  Respiratory: CTA bilaterally, no wheeze, rales or rhonchi	  Abdomen: soft, NT/ND  Extremities: warm, ROM nl, 2+ PP bilaterally  Neuro: Nonfocal      LABS:                        14.1   6.70  )-----------( 218      ( 19 Sep 2022 07:49 )             42.1     09-19    141  |  106  |  17  ----------------------------<  84  4.2   |  25  |  0.8    Ca    9.2      19 Sep 2022 07:49  Mg     2.1     09-19    TPro  6.3  /  Alb  4.5  /  TBili  0.3  /  DBili  x   /  AST  18  /  ALT  11  /  AlkPhos  87  09-19      Troponin T, Serum: 0.01 ng/mL (09-19-22 @ 20:58)  Troponin T, Serum: <0.01 ng/mL (09-19-22 @ 07:49)        < from: Xray Chest 1 View-PORTABLE IMMEDIATE (Xray Chest 1 View-PORTABLE IMMEDIATE .) (09.19.22 @ 08:29) >  Impression:    Hyperaerated lungs. No focal infiltrate.    < end of copied text >      < from: CT Angio Heart and Coronaries w/ IV Cont (09.19.22 @ 16:39) >  IMPRESSION:    Focal severe narrowing within the mid RCA.    Mild to moderate LCx ostial narrowing.    Mixed calcified and noncalcified plaque proximal LAD resulting in mild   narrowing.    The total Agatston coronary artery calcium score equals 101, which   corresponds to 72 percentile for age, gender and ethnicity.    CAD-RADS 4A.    < end of copied text >          < from: 12 Lead ECG (09.19.22 @ 21:04) >  Ventricular Rate 56 BPM    Atrial Rate 56 BPM    P-R Interval 202 ms    QRS Duration 86 ms    Q-T Interval 474 ms    QTC Calculation(Bazett) 457 ms    P Axis 83 degrees    R Axis -41 degrees    T Axis -65 degrees    Diagnosis Line Sinus bradycardia  Left axis deviation  Left anterior fascicular block  T wave abnormality, consider inferior ischemia  Abnormal ECG      < end of copied text >

## 2022-09-20 NOTE — ASU PATIENT PROFILE, ADULT - ABILITY TO HEAR (WITH HEARING AID OR HEARING APPLIANCE IF NORMALLY USED):
Ifob mailed to address on file.      B UE's grossly WFL. B LE's grossly WFL. Adequate: hears normal conversation without difficulty

## 2022-09-20 NOTE — H&P ADULT - ASSESSMENT
73yo F w/ pmhx of HLD, tobacco use disorder presents to ED for chest pain. Admitted for positive CCTA and ACS work up.     #chest pain   #new diagnosis of CAD  #hx of HLD   - atypical symptoms; did not improve with pepcid but better with lopressor   - ECG: HR 67, no signficant signs of ischemia  - CCTA: Focal severe narrowing within the mid RCA. Mild to moderate LCx ostial narrowing. Mixed calcified and noncalcified plaque proximal LAD resulting in mild narrowing.  - cardio consult for further work up, stress test vs. cath?; npo after MN   - trend trop; get echo; get a1c/lipid profile/TSH  - start asa 81mg qd, atorvastatin 80mg, hold lopressor for now due to mild bradycardia    #tobacco use disorder  - needs o/p CT lung ca screening; will give nicotine patch   - counselled on smoking cessation     #cataracts: c/w eye drops    DVT ppx: lovenox  GI ppx: none  Diet: DASH; npo after MN   Code Status: full code  Dispo: from home no needs; cardio consult, echo

## 2022-09-20 NOTE — ED ADULT NURSE REASSESSMENT NOTE - NS ED NURSE REASSESS COMMENT FT1
patient admitted to [facility] and moved to 20B. Patient remains on cardiac monitor. Report given to DENVER Lan.

## 2022-09-20 NOTE — H&P ADULT - HISTORY OF PRESENT ILLNESS
71yo F w/ pmhx of HLD, tobacco use disorder presents to ED for chest pain. Described as substernal radiating to b/l shoulders and throat. CP was burning in nature but throat pain is not. PCP was more concerned about GERD and prescribed pepcid for 3 days without relief. This has been going on for the past month and CP happens randomly without any provocative factors. Can happen with either rest or exertion. Denies palpable or pleuritic chest pain. Was prescribed atorvastatin but was waiting for insurance approval so did not take it yet. Denies palpitations, SOB, diaphoresis, N/V. Patient took two renuka aspirins for her neck pain due to cervical stenosis. Got an additional two baby aspirins in the ED. Placed in obs for CCTA which was positive. CP resolved after lopressor. Admitted to medicine.     ED course:   vitals: /71, HR 71, T 96.8F, O2 98% RA  labs: trop neg x 2   imaging:   - ECG: HR 67, no signficant signs of ischemia  - CCTA: Focal severe narrowing within the mid RCA. Mild to moderate LCx ostial narrowing. Mixed calcified and noncalcified plaque proximal LAD resulting in mild narrowing.  given: asa 162, lopressor 50mg

## 2022-09-20 NOTE — H&P ADULT - ATTENDING COMMENTS
Patient seen and examined at bedside independently of the residents. I read the resident's note and agree with the plan with the additions and corrections as noted below.    REVIEW OF SYSTEMS:  CONSTITUTIONAL: No weakness, fevers or chills  EYES/ENT: No visual changes;  No vertigo or throat pain   NECK: No pain or stiffness  RESPIRATORY: No cough, wheezing, hemoptysis; No shortness of breath  CARDIOVASCULAR: Chest pain.   GASTROINTESTINAL: No abdominal or epigastric pain. No nausea, vomiting, or hematemesis; No diarrhea or constipation. No melena or hematochezia.  GENITOURINARY: No dysuria, frequency or hematuria  NEUROLOGICAL: No numbness or weakness  SKIN: No itching, rashes.     PMH: HLD and Tobacco use disorder     FHx: Reviewed. Not relevant.     Physical Exam:  GEN: No acute distress. A & O x 3.   HEENT: PEERLA. No sclera icterus. EOMI.   LUNGS: Clear to auscultation bilaterally. No wheeze/rales/crackles.   HEART: Normal. S1/S2 present. RRR. No murmur/gallops.   ABD: Soft, non-tender, non-distended. Bowel sounds present.   EXT: No pitting edema.   Integumentary: No rash, No petechia.   NEURO: CN III-XII intact. Strength: 5/5 b/l ULE. Sensory intact b/l ULE.     Vital Signs Last 24 Hrs  T(C): 36.9 (20 Sep 2022 00:14), Max: 36.9 (20 Sep 2022 00:14)  T(F): 98.4 (20 Sep 2022 00:14), Max: 98.4 (20 Sep 2022 00:14)  HR: 61 (20 Sep 2022 00:14) (59 - 71)  BP: 118/56 (20 Sep 2022 00:14) (113/56 - 146/71)  BP(mean): --  RR: 18 (20 Sep 2022 00:14) (17 - 18)  SpO2: 98% (20 Sep 2022 00:14) (96% - 98%)    Parameters below as of 20 Sep 2022 00:14  Patient On (Oxygen Delivery Method): room air      Please see the above notes for Labs and radiology.     Assessment and Plan:     71 yo F with hx of HLD and Tobacco use disorder presents to ED for chest pain, found to have positive CCTA.     Atypical Chest pain (r/o ACS)   - EKG shows NSR with HR 67. Non specific ST abnormality.   - Troponin neg x 2.   - CCTA shows Focal severe narrowing within the mid RCA. Mild to moderate LCx ostial narrowing. Mixed calcified and noncalcified plaque proximal LAD resulting in mild narrowing. CAD-RADS 4A.  - check TTE, HbA1c and Lipid panel  - c/w ASA, Statin   - Cardiology evaluation    Tobacco use disorder - nicotine patch. smoking cessation counseling provided.     DVT ppx: Lovenox SC  GI ppx: PPI   Diet: DASH  Activity: as tolerated.     Date seen by the attendin2022.

## 2022-09-20 NOTE — CONSULT NOTE ADULT - ATTENDING COMMENTS
CAD with Unstable Angina      - Cardiac cath today - Dr. Brito  - COVID-19 PCR: Rosalia (19 Sep 2022 07:49)  - NPO

## 2022-09-20 NOTE — PATIENT PROFILE ADULT - FALL HARM RISK - HARM RISK INTERVENTIONS

## 2022-09-20 NOTE — H&P ADULT - NSHPLABSRESULTS_GEN_ALL_CORE
LABS:  cret                        14.1   6.70  )-----------( 218      ( 19 Sep 2022 07:49 )             42.1     09-19    141  |  106  |  17  ----------------------------<  84  4.2   |  25  |  0.8    Ca    9.2      19 Sep 2022 07:49  Mg     2.1     09-19    TPro  6.3  /  Alb  4.5  /  TBili  0.3  /  DBili  x   /  AST  18  /  ALT  11  /  AlkPhos  87  09-19    imaging:   - ECG: HR 67, no signficant signs of ischemia  - CCTA: Focal severe narrowing within the mid RCA. Mild to moderate LCx ostial narrowing. Mixed calcified and noncalcified plaque proximal LAD resulting in mild narrowing.

## 2022-09-20 NOTE — H&P ADULT - NSHPPHYSICALEXAM_GEN_ALL_CORE
PHYSICAL EXAM:  GENERAL: NAD, lying in bed comfortably  HEAD:  Atraumatic, Normocephalic  EYES: EOMI, PERRLA, conjunctiva and sclera clear  ENT: Moist mucous membranes  NECK: Supple, No JVD  CHEST/LUNG: No tenderness to palpitations, Clear to auscultation bilaterally; No rales, rhonchi, wheezing, or rubs. Unlabored respirations  HEART: Regular rate and rhythm; No murmurs, rubs, or gallops  ABDOMEN: Bowel sounds present; Soft, Nontender, Nondistended. No hepatomegally  EXTREMITIES:  2+ Peripheral Pulses, brisk capillary refill. No clubbing, cyanosis, or edema  NERVOUS SYSTEM:  Alert & Oriented X3, speech clear. No deficits   MSK: FROM all 4 extremities, full and equal strength  SKIN: No rashes or lesions

## 2022-09-20 NOTE — CONSULT NOTE ADULT - ASSESSMENT
Atypical chest pain  HLD    -CCTA showing severe RCA stenosis  -trop (-) x3  -cath today   -NPO  -TTE  -c/w ASA  -c/w atorvastatin Atypical chest pain with Positive CCTA  HLD    -CCTA showing severe RCA stenosis, moderate ostial LCx. CADRADS 4A  -trop (-) x3  -cath today   -NPO  -TTE  -c/w ASA  -c/w atorvastatin Chest pain with Positive CCTA  HLD    -CCTA showing severe RCA stenosis, moderate ostial LCx. CADRADS 4A  -trop (-) x3  -cath today   -NPO  -TTE  -c/w ASA  -c/w atorvastatin

## 2022-09-20 NOTE — CHART NOTE - NSCHARTNOTEFT_GEN_A_CORE
INCOMPLETE TEST ************************************************************************************          PRE-OP DIAGNOSIS:  (+) CCTA; Unstable Angina       PROCEDURE:     [x] Coronary Angiogram     [x] LHC     [] LVG     [] RHC     [] Intervention (see below)         PHYSICIAN:  Dr. Brito     ASSISTANT:  Dr. Rodriguez, Dr. AWAIS Reyes       PROCEDURE DESCRIPTION:     Consent:      [x] Patient     [] Family Member     []  Used        Anesthesia:     [] General     [x] Sedation     [x] Local        Access & Closure:     [x] 6Fr Right Radial Artery; D-STAT     [] Fr Femoral Artery     [] Fr Femoral Vein     [] Fr Brachial Vein       IV Contrast: mL        Intervention: none       Implants: none        FINDINGS:     Coronary Dominance: Right       LM:     LAD:     CX:     Ramus:     RCA:        LVEDP: mmHg     EF: 69%        ESTIMATED BLOOD LOSS: < 10 mL        CONDITION:     [x] Good     [] Fair     [] Critical        SPECIMEN REMOVED: N/A       POST-OP DIAGNOSIS:      [] Normal Coronary Angiogram     [] Mild Coronary Artery Disease (< 50% stenosis)     [] __ Vessel Coronary Artery Disease        PLAN OF CARE:     [x] Return to In-patient bed     [x] Medications:     [x] IV Fluids: start NS@150cc/hr for 10 hours PRE-OP DIAGNOSIS:  (+) CCTA; Unstable Angina       PROCEDURE:     [x] Coronary Angiogram     [x] LHC     [] LVG     [] RHC     [] Intervention (see below)         PHYSICIAN:  Dr. Brito     ASSISTANT:  Dr. AWAIS Stoll       PROCEDURE DESCRIPTION:     Consent:      [x] Patient     [] Family Member     []  Used        Anesthesia:     [] General     [x] Sedation     [x] Local        Access & Closure:     [x] 6Fr Right Radial Artery; D-STAT     [] Fr Femoral Artery     [] Fr Femoral Vein     [] Fr Brachial Vein       IV Contrast: 120 mL        Intervention: s/p successful balloon angioplasty and DESx1 in Mid RCA      Implants: SYNERGY XD 3.0X24 (AUC 7)       FINDINGS:     Coronary Dominance: Right       LM: none    LAD: mild disease  D1: mild disease    CX: mild disease  OM1: mild disease     RCA: 95% stenosis in the mid RCA s/p successful balloon angioplasty and DESx1  RPDA: mild disease        EF: 69%        ESTIMATED BLOOD LOSS: < 10 mL        CONDITION:     [x] Good     [] Fair     [] Critical        SPECIMEN REMOVED: N/A       POST-OP DIAGNOSIS:      [] Normal Coronary Angiogram     [] Mild Coronary Artery Disease (< 50% stenosis)     [x] _1_ Vessel Coronary Artery Disease (RCA)        PLAN OF CARE:     [x] Return to In-patient bed     [x] Medications:   - start ASA and Brilinta  - start Atorvastatin 80mg daily   - start metoprolol 12.5q12 (hold if HR <60 or SBP <90)    [x] IV Fluids: start NS@150cc/hr for 10 hours PRE-OP DIAGNOSIS:  (+) CCTA; Unstable Angina       PROCEDURE:     [x] Coronary Angiogram     [x] LHC     [] LVG     [] RHC     [] Intervention (see below)         PHYSICIAN:  Dr. Brito     ASSISTANT:  Dr. AWAIS Stoll       PROCEDURE DESCRIPTION:     Consent:      [x] Patient     [] Family Member     []  Used        Anesthesia:     [] General     [x] Sedation     [x] Local        Access & Closure:     [x] 6Fr Right Radial Artery; D-STAT     [] Fr Femoral Artery     [] Fr Femoral Vein     [] Fr Brachial Vein       IV Contrast: 120 mL        Intervention: s/p successful balloon angioplasty and DESx1 in Mid RCA      Implants: SYNERGY XD 3.0X24 (AUC 8)       FINDINGS:     Coronary Dominance: Right       LM: none    LAD: mild disease  D1: mild disease    CX: mild disease  OM1: mild disease     RCA: 95% stenosis in the mid RCA s/p successful balloon angioplasty and DESx1  RPDA: mild disease        EF: 69%        ESTIMATED BLOOD LOSS: < 10 mL        CONDITION:     [x] Good     [] Fair     [] Critical        SPECIMEN REMOVED: N/A       POST-OP DIAGNOSIS:      [] Normal Coronary Angiogram     [] Mild Coronary Artery Disease (< 50% stenosis)     [x] _1_ Vessel Coronary Artery Disease (RCA)        PLAN OF CARE:     [x] Return to In-patient bed     [x] Medications:   - start ASA and Brilinta  - start Atorvastatin 80mg daily   - start metoprolol 12.5q12 (hold if HR <60 or SBP <90)    [x] IV Fluids: start NS@150cc/hr for 10 hours

## 2022-09-20 NOTE — CHART NOTE - NSCHARTNOTEFT_GEN_A_CORE
Pt seen and examined. Pt evaluated by overnight nocturnist. Admitted for chest pain, CCTA +. Plan for [facility] today. Will f/u cardio recs

## 2022-09-21 ENCOUNTER — TRANSCRIPTION ENCOUNTER (OUTPATIENT)
Age: 72
End: 2022-09-21

## 2022-09-21 VITALS — SYSTOLIC BLOOD PRESSURE: 101 MMHG | HEART RATE: 72 BPM | TEMPERATURE: 98 F | DIASTOLIC BLOOD PRESSURE: 51 MMHG

## 2022-09-21 LAB
ALBUMIN SERPL ELPH-MCNC: 3.9 G/DL — SIGNIFICANT CHANGE UP (ref 3.5–5.2)
ALP SERPL-CCNC: 88 U/L — SIGNIFICANT CHANGE UP (ref 30–115)
ALT FLD-CCNC: 9 U/L — SIGNIFICANT CHANGE UP (ref 0–41)
ANION GAP SERPL CALC-SCNC: 18 MMOL/L — HIGH (ref 7–14)
AST SERPL-CCNC: 19 U/L — SIGNIFICANT CHANGE UP (ref 0–41)
BASOPHILS # BLD AUTO: 0.05 K/UL — SIGNIFICANT CHANGE UP (ref 0–0.2)
BASOPHILS NFR BLD AUTO: 0.6 % — SIGNIFICANT CHANGE UP (ref 0–1)
BILIRUB SERPL-MCNC: 0.9 MG/DL — SIGNIFICANT CHANGE UP (ref 0.2–1.2)
BUN SERPL-MCNC: 21 MG/DL — HIGH (ref 10–20)
CALCIUM SERPL-MCNC: 8.2 MG/DL — LOW (ref 8.4–10.5)
CHLORIDE SERPL-SCNC: 111 MMOL/L — HIGH (ref 98–110)
CO2 SERPL-SCNC: 17 MMOL/L — SIGNIFICANT CHANGE UP (ref 17–32)
CREAT SERPL-MCNC: 0.7 MG/DL — SIGNIFICANT CHANGE UP (ref 0.7–1.5)
EGFR: 93 ML/MIN/1.73M2 — SIGNIFICANT CHANGE UP
EOSINOPHIL # BLD AUTO: 0.11 K/UL — SIGNIFICANT CHANGE UP (ref 0–0.7)
EOSINOPHIL NFR BLD AUTO: 1.3 % — SIGNIFICANT CHANGE UP (ref 0–8)
GLUCOSE BLDC GLUCOMTR-MCNC: 187 MG/DL — HIGH (ref 70–99)
GLUCOSE SERPL-MCNC: 47 MG/DL — CRITICAL LOW (ref 70–99)
HCT VFR BLD CALC: 39 % — SIGNIFICANT CHANGE UP (ref 37–47)
HGB BLD-MCNC: 13.3 G/DL — SIGNIFICANT CHANGE UP (ref 12–16)
IMM GRANULOCYTES NFR BLD AUTO: 0.4 % — HIGH (ref 0.1–0.3)
LYMPHOCYTES # BLD AUTO: 0.75 K/UL — LOW (ref 1.2–3.4)
LYMPHOCYTES # BLD AUTO: 9.1 % — LOW (ref 20.5–51.1)
MAGNESIUM SERPL-MCNC: 2 MG/DL — SIGNIFICANT CHANGE UP (ref 1.8–2.4)
MCHC RBC-ENTMCNC: 30.2 PG — SIGNIFICANT CHANGE UP (ref 27–31)
MCHC RBC-ENTMCNC: 34.1 G/DL — SIGNIFICANT CHANGE UP (ref 32–37)
MCV RBC AUTO: 88.4 FL — SIGNIFICANT CHANGE UP (ref 81–99)
MONOCYTES # BLD AUTO: 0.79 K/UL — HIGH (ref 0.1–0.6)
MONOCYTES NFR BLD AUTO: 9.6 % — HIGH (ref 1.7–9.3)
NEUTROPHILS # BLD AUTO: 6.5 K/UL — SIGNIFICANT CHANGE UP (ref 1.4–6.5)
NEUTROPHILS NFR BLD AUTO: 79 % — HIGH (ref 42.2–75.2)
NRBC # BLD: 0 /100 WBCS — SIGNIFICANT CHANGE UP (ref 0–0)
PLATELET # BLD AUTO: 194 K/UL — SIGNIFICANT CHANGE UP (ref 130–400)
POTASSIUM SERPL-MCNC: 4.2 MMOL/L — SIGNIFICANT CHANGE UP (ref 3.5–5)
POTASSIUM SERPL-SCNC: 4.2 MMOL/L — SIGNIFICANT CHANGE UP (ref 3.5–5)
PROT SERPL-MCNC: 5.7 G/DL — LOW (ref 6–8)
RBC # BLD: 4.41 M/UL — SIGNIFICANT CHANGE UP (ref 4.2–5.4)
RBC # FLD: 13.4 % — SIGNIFICANT CHANGE UP (ref 11.5–14.5)
SODIUM SERPL-SCNC: 146 MMOL/L — SIGNIFICANT CHANGE UP (ref 135–146)
WBC # BLD: 8.23 K/UL — SIGNIFICANT CHANGE UP (ref 4.8–10.8)
WBC # FLD AUTO: 8.23 K/UL — SIGNIFICANT CHANGE UP (ref 4.8–10.8)

## 2022-09-21 PROCEDURE — 93010 ELECTROCARDIOGRAM REPORT: CPT

## 2022-09-21 PROCEDURE — 99239 HOSP IP/OBS DSCHRG MGMT >30: CPT

## 2022-09-21 RX ORDER — DORZOLAMIDE HYDROCHLORIDE TIMOLOL MALEATE 20; 5 MG/ML; MG/ML
1 SOLUTION/ DROPS OPHTHALMIC
Qty: 0 | Refills: 0 | DISCHARGE

## 2022-09-21 RX ORDER — LATANOPROST 0.05 MG/ML
1 SOLUTION/ DROPS OPHTHALMIC; TOPICAL
Qty: 1 | Refills: 0
Start: 2022-09-21

## 2022-09-21 RX ORDER — LATANOPROST 0.05 MG/ML
1 SOLUTION/ DROPS OPHTHALMIC; TOPICAL
Qty: 0 | Refills: 0 | DISCHARGE

## 2022-09-21 RX ORDER — METOPROLOL TARTRATE 50 MG
1 TABLET ORAL
Qty: 0 | Refills: 0 | DISCHARGE

## 2022-09-21 RX ORDER — METOPROLOL TARTRATE 50 MG
25 TABLET ORAL DAILY
Refills: 0 | Status: DISCONTINUED | OUTPATIENT
Start: 2022-09-21 | End: 2022-09-21

## 2022-09-21 RX ORDER — LIDOCAINE 4 G/100G
1 CREAM TOPICAL
Qty: 10 | Refills: 0
Start: 2022-09-21 | End: 2022-09-25

## 2022-09-21 RX ORDER — ATORVASTATIN CALCIUM 80 MG/1
1 TABLET, FILM COATED ORAL
Qty: 30 | Refills: 0
Start: 2022-09-21 | End: 2022-10-20

## 2022-09-21 RX ORDER — TICAGRELOR 90 MG/1
1 TABLET ORAL
Qty: 60 | Refills: 3
Start: 2022-09-21 | End: 2023-01-18

## 2022-09-21 RX ORDER — ASPIRIN/CALCIUM CARB/MAGNESIUM 324 MG
1 TABLET ORAL
Qty: 30 | Refills: 0
Start: 2022-09-21 | End: 2022-10-20

## 2022-09-21 RX ORDER — METHOCARBAMOL 500 MG/1
1 TABLET, FILM COATED ORAL
Qty: 9 | Refills: 0
Start: 2022-09-21 | End: 2022-09-23

## 2022-09-21 RX ORDER — NICOTINE POLACRILEX 2 MG
1 GUM BUCCAL
Qty: 14 | Refills: 0
Start: 2022-09-21 | End: 2022-10-04

## 2022-09-21 RX ORDER — TICAGRELOR 90 MG/1
1 TABLET ORAL
Qty: 60 | Refills: 0
Start: 2022-09-21 | End: 2022-10-20

## 2022-09-21 RX ORDER — ATORVASTATIN CALCIUM 80 MG/1
1 TABLET, FILM COATED ORAL
Qty: 0 | Refills: 0 | DISCHARGE
Start: 2022-09-21

## 2022-09-21 RX ORDER — ASPIRIN/CALCIUM CARB/MAGNESIUM 324 MG
1 TABLET ORAL
Qty: 0 | Refills: 0 | DISCHARGE
Start: 2022-09-21

## 2022-09-21 RX ORDER — DORZOLAMIDE HYDROCHLORIDE TIMOLOL MALEATE 20; 5 MG/ML; MG/ML
1 SOLUTION/ DROPS OPHTHALMIC
Qty: 1 | Refills: 0
Start: 2022-09-21

## 2022-09-21 RX ORDER — METOPROLOL TARTRATE 50 MG
12.5 TABLET ORAL
Refills: 0 | Status: DISCONTINUED | OUTPATIENT
Start: 2022-09-21 | End: 2022-09-21

## 2022-09-21 RX ORDER — TICAGRELOR 90 MG/1
1 TABLET ORAL
Qty: 0 | Refills: 0 | DISCHARGE
Start: 2022-09-21

## 2022-09-21 RX ORDER — NICOTINE POLACRILEX 2 MG
0 GUM BUCCAL
Qty: 0 | Refills: 0 | DISCHARGE
Start: 2022-09-21

## 2022-09-21 RX ADMIN — Medication 81 MILLIGRAM(S): at 11:28

## 2022-09-21 RX ADMIN — Medication 650 MILLIGRAM(S): at 03:30

## 2022-09-21 RX ADMIN — TICAGRELOR 90 MILLIGRAM(S): 90 TABLET ORAL at 06:04

## 2022-09-21 RX ADMIN — ENOXAPARIN SODIUM 40 MILLIGRAM(S): 100 INJECTION SUBCUTANEOUS at 06:24

## 2022-09-21 RX ADMIN — DORZOLAMIDE HYDROCHLORIDE 1 DROP(S): 20 SOLUTION/ DROPS OPHTHALMIC at 06:05

## 2022-09-21 RX ADMIN — Medication 1 DROP(S): at 06:05

## 2022-09-21 RX ADMIN — CHLORHEXIDINE GLUCONATE 1 APPLICATION(S): 213 SOLUTION TOPICAL at 06:07

## 2022-09-21 RX ADMIN — Medication 650 MILLIGRAM(S): at 02:49

## 2022-09-21 NOTE — DISCHARGE NOTE PROVIDER - NSDCCPCAREPLAN_GEN_ALL_CORE_FT
PRINCIPAL DISCHARGE DIAGNOSIS  Diagnosis: Unstable angina pectoris  Assessment and Plan of Treatment: You came to the hospital kraig chest pain and had a test which showed narrowing of one of the arteries suppling the heart. You were catheterised during the admission and you were found have blockage in the same vessel, and the artery was expanded during the procedure. Kindly take your meds as expected and follow up your doctors as suggested.        SECONDARY DISCHARGE DIAGNOSES  Diagnosis: CAD (coronary artery disease)  Assessment and Plan of Treatment:

## 2022-09-21 NOTE — DISCHARGE NOTE PROVIDER - HOSPITAL COURSE
71yo F w/ pmhx of HLD, tobacco use disorder presents to ED for chest pain, described as substernal radiating to b/l shoulders and throat for the past month and CP happens randomly without any provocative factors.  Her CTA showed severe narrowing in the RCA, mild to moderate LCX, mild narrowing in the LAD  Pt underwent catheterisation and 95% stenosis was found in the mid RCA for which balloon angioplasty with DESx1 was performed  Pt will be discharged on     ASA and Brilinta  Atorvastatin 80mg daily   metoprolol    73yo F w/ pmhx of HLD, tobacco use disorder presents to ED for chest pain, described as substernal radiating to b/l shoulders and throat for the past month and CP happens randomly without any provocative factors.  Her CTA showed severe narrowing in the RCA, mild to moderate LCX, mild narrowing in the LAD  Pt underwent catheterisation and 95% stenosis was found in the mid RCA for which balloon angioplasty with DESx1 was performed  Pt will be discharged on     ASA and Brilinta  Atorvastatin 80mg daily

## 2022-09-21 NOTE — DISCHARGE NOTE PROVIDER - PROVIDER TOKENS
PROVIDER:[TOKEN:[[id number]],FOLLOWUP:[2 weeks]],PROVIDER:[TOKEN:[[id number]],FOLLOWUP:[1 week]]

## 2022-09-21 NOTE — PROGRESS NOTE ADULT - SUBJECTIVE AND OBJECTIVE BOX
Cardiology Follow up s/p PCI SAMSON    KOMAL BOLES   72y Female  PAST MEDICAL & SURGICAL HISTORY:  Glaucoma of both eyes, unspecified glaucoma type      Hyperlipidemia      History of surgery  LEFT EYE CATARACT EXTRACTION WITH LENS IMPLANT           HPI:  71yo F w/ pmhx of HLD, tobacco use disorder presents to ED for chest pain. Described as substernal radiating to b/l shoulders and throat. CP was burning in nature but throat pain is not. PCP was more concerned about GERD and prescribed pepcid for 3 days without relief. This has been going on for the past month and CP happens randomly without any provocative factors. Can happen with either rest or exertion. Denies palpable or pleuritic chest pain. Was prescribed atorvastatin but was waiting for insurance approval so did not take it yet. Denies palpitations, SOB, diaphoresis, N/V. Patient took two renuka aspirins for her neck pain due to cervical stenosis. Got an additional two baby aspirins in the ED. Placed in obs for CCTA which was positive. CP resolved after lopressor. Admitted to medicine.     ED course:   vitals: /71, HR 71, T 96.8F, O2 98% RA  labs: trop neg x 2   imaging:   - ECG: HR 67, no signficant signs of ischemia  - CCTA: Focal severe narrowing within the mid RCA. Mild to moderate LCx ostial narrowing. Mixed calcified and noncalcified plaque proximal LAD resulting in mild narrowing.  given: asa 162, lopressor 50mg (20 Sep 2022 00:09)    Allergies    No Known Allergies    Intolerances    epinephrine (Other)    Patient seen and examined at bedside. No acute events overnight.  Patient without complaints. Pt ambulated without issues/symptoms  Denies CP, SOB, palpitations, or dizziness  No events on telemetry overnight    Vital Signs Last 24 Hrs  T(C): 36.9 (21 Sep 2022 04:36), Max: 36.9 (21 Sep 2022 04:36)  T(F): 98.4 (21 Sep 2022 04:36), Max: 98.4 (21 Sep 2022 04:36)  HR: 72 (21 Sep 2022 04:36) (66 - 72)  BP: 101/51 (21 Sep 2022 04:36) (101/51 - 116/56)  BP(mean): --  RR: 18 (20 Sep 2022 21:16) (18 - 18)  SpO2: --      MEDICATIONS  (STANDING):  aspirin  chewable 81 milliGRAM(s) Oral daily  atorvastatin 80 milliGRAM(s) Oral at bedtime  chlorhexidine 2% Cloths 1 Application(s) Topical <User Schedule>  dorzolamide 2% Ophthalmic Solution 1 Drop(s) Both EYES <User Schedule>  enoxaparin Injectable 40 milliGRAM(s) SubCutaneous every 24 hours  latanoprost 0.005% Ophthalmic Solution 1 Drop(s) Both EYES at bedtime  nicotine -  14 mG/24Hr(s) Patch 1 Patch Transdermal daily  sodium chloride 0.9%. 1000 milliLiter(s) (150 mL/Hr) IV Continuous <Continuous>  ticagrelor 90 milliGRAM(s) Oral every 12 hours  timolol 0.5% Solution 1 Drop(s) Both EYES every 12 hours    MEDICATIONS  (PRN):  acetaminophen     Tablet .. 650 milliGRAM(s) Oral every 6 hours PRN Temp greater or equal to 38C (100.4F), Mild Pain (1 - 3)      REVIEW OF SYSTEMS:          All negative except as mentioned in HPI    PHYSICAL EXAM:           CONSTITUTIONAL: Well-developed; well-nourished; in no acute distress  	SKIN: warm, dry  	HEAD: Normocephalic; atraumatic  	EYES: PERRL.  	ENT: No nasal discharge, airway clear, mucous membranes moist  	NECK: Supple; non tender.  	CARD: +S1, +S2, no murmurs, gallops, or rubs. Regular rate and rhythm    	RESP: No wheezes, rales or rhonchi. CTA B/L  	ABD: soft ntnd, + BS x 4 quadrants  	EXT: moves all extremities,  no clubbing, cyanosis or edema  	NEURO: Alert and oriented x3, no focal deficits          PSYCH: Cooperative, appropriate          VASCULAR:  + Rad / + PTs / +  DPs          EXTREMITY:              Right Radial: pressure dressing removed, access site soft, no hematoma, no pain, + pulses, no sign of infection, no numbness            ECG:   < from: 12 Lead ECG (09.21.22 @ 08:28) >  Ventricular Rate 64 BPM    Atrial Rate 64 BPM    P-R Interval 184 ms    QRS Duration 82 ms    Q-T Interval 442 ms    QTC Calculation(Bazett) 455 ms    P Axis 82 degrees    R Axis -61 degrees    T Axis -61 degrees    Diagnosis Line Normal sinus rhythm  Left axis deviation  Left anterior fascicular block  ST & T wave abnormality, consider inferior ischemia  Abnormal ECG    Confirmed by SOHAN MARTINES MD ([id number]) on 9/21/2022 9:01:08 AM                                                                                                                2D ECHO:  < from: TTE Echo Complete w/o Contrast w/ Doppler (09.20.22 @ 07:38) >  Summary:   1. Normal global left ventricular systolic function.   2. LV Ejection Fraction by Kimbrough's Method with a biplane EF of 69 %.   3. Normal left ventricular internal cavity size.   4. Normal left atrial size.   5. Normal right atrial size.   6. Mild mitral valve regurgitation.   7. Mild thickening of the anterior mitral valve leaflet.   8. Mild tricuspid regurgitation.    LABS:                        13.3   8.23  )-----------( 194      ( 21 Sep 2022 06:00 )             39.0     09-21    146  |  111<H>  |  21<H>  ----------------------------<  47<LL>  4.2   |  17  |  0.7    Ca    8.2<L>      21 Sep 2022 06:00  Mg     2.0     09-21    TPro  5.7<L>  /  Alb  3.9  /  TBili  0.9  /  DBili  x   /  AST  19  /  ALT  9   /  AlkPhos  88  09-21    CARDIAC MARKERS ( 19 Sep 2022 20:58 )  x     / 0.01 ng/mL / x     / x     / x        Magnesium, Serum: 2.0 mg/dL [1.8 - 2.4] (09-21-22 @ 06:00)  LIVER FUNCTIONS - ( 21 Sep 2022 06:00 )  Alb: 3.9 g/dL / Pro: 5.7 g/dL / ALK PHOS: 88 U/L / ALT: 9 U/L / AST: 19 U/L / GGT: x             A/P:  I discussed the case with Cardiologist Dr. Brito and recommend the following:    S/P PCI:   Intervention: s/p successful balloon angioplasty and DESx1 in Mid RCA    Implants: SYNERGY XD 3.0X24 (AUC 8)    FINDINGS:     Coronary Dominance: Right     LM: none    LAD: mild disease  D1: mild disease    CX: mild disease  OM1: mild disease     RCA: 95% stenosis in the mid RCA s/p successful balloon angioplasty and DESx1  RPDA: mild disease      EF: 69%                       Monitor blood glucose                    No ACEi/ARB due to EF NL, No DM                   D/C B-Blocker due to junctional episode on TELE, will reevaluate as OP    	     Continue DAPT ( Aspirin 81 mg PO Daily and Brilinta 90 mg PO q12hr ), Statin Therapy                   Patient pharmacy called in for Brilinta -> Patient need Prior Authorization for Brilinta - will get it as OP as per Dr. Brito                   Patient given 30 day supply of Brilinta 90 mg PO q12 hr from [facility] for FREE to take it home                    Patient agreeing to take DAPT for at least one year or as directed by cardiologist                    Pt given instructions on importance of taking antiplatelet medication or risk acute stent thrombosis/death                   Post cath instructions, access site care and activity restrictions reviewed with patient                     Discussed with patient to return to hospital if experience chest pain, shortness breath, dizziness and site bleeding                   Aggressive risk factor modification, diet counseling, smoking cessation discussed with patient                       Can discharge patient from cardiac standpoint after ambulating without symptoms and access site wnl, ECG and blood work reviewed                    Benefits of Cardiac Rehab discussed with patient, All documents sent to [facility]. Patient instructed to call and make first                               appointment after first f/u visit with Cardiologist                    Follow up with Cardiology Dr. Brito in two weeks. Instructed to call and make an appointment

## 2022-09-21 NOTE — DISCHARGE NOTE NURSING/CASE MANAGEMENT/SOCIAL WORK - PATIENT PORTAL LINK FT
You can access the FollowMyHealth Patient Portal offered by [facility] by registering at the following website: [email or web address]. By joining [facility]’s FollowMyHealth portal, you will also be able to view your health information using other applications (apps) compatible with our system.

## 2022-09-21 NOTE — PROGRESS NOTE ADULT - SUBJECTIVE AND OBJECTIVE BOX
KOMAL BOLES  72y Female    CHIEF COMPLAINT:    Patient is a 72y old  Female who presents with a chief complaint of Chest pain (20 Sep 2022 07:12)      INTERVAL HPI/OVERNIGHT EVENTS:    Patient seen and examined.    ROS: All other systems are negative.    Vital Signs:    T(F): 98.4 (22 @ 04:36), Max: 98.4 (22 @ 04:36)  HR: 72 (22 @ 04:36) (66 - 72)  BP: 101/51 (22 @ 04:36) (101/51 - 116/56)  RR: 18 (22 @ 21:16) (18 - 18)  SpO2: --  I&O's Summary    20 Sep 2022 07:01  -  21 Sep 2022 07:00  --------------------------------------------------------  IN: 1500 mL / OUT: 0 mL / NET: 1500 mL      Daily Height in cm: 172.72 (20 Sep 2022 21:16)    Daily Weight in k (20 Sep 2022 21:16)  CAPILLARY BLOOD GLUCOSE          PHYSICAL EXAM:    GENERAL:  NAD  SKIN: No rashes or lesions  HENT: Atraumatic. Normocephalic. PERRL. Moist membranes.  NECK: Supple, No JVD. No lymphadenopathy.  PULMONARY: CTA B/L. No wheezing. No rales  CVS: Normal S1, S2. Rate and Rhythm are regular. No murmurs.  ABDOMEN/GI: Soft, Nontender, Nondistended; BS present  EXTREMITIES: Peripheral pulses intact. No edema B/L LE.  NEUROLOGIC:  No motor or sensory deficit.  PSYCH: Alert & oriented x 3    Consultant(s) Notes Reviewed:  [x ] YES  [ ] NO  Care Discussed with Consultants/Other Providers [ x] YES  [ ] NO    EKG reviewed  Telemetry reviewed    LABS:                        13.3   8.23  )-----------( 194      ( 21 Sep 2022 06:00 )             39.0     09-20    145  |  107  |  19  ----------------------------<  93  4.5   |  27  |  0.8    Ca    9.1      20 Sep 2022 09:19  Mg     2.3         TPro  6.6  /  Alb  4.5  /  TBili  0.5  /  DBili  x   /  AST  20  /  ALT  11  /  AlkPhos  92          Trop 0.01, CKMB --, CK --, 22 @ 20:58  Trop <0.01, CKMB --, CK --, 22 @ 07:49        RADIOLOGY & ADDITIONAL TESTS:    < from: TTE Echo Complete w/o Contrast w/ Doppler (22 @ 07:38) >    Summary:   1. Normal global left ventricular systolic function.   2. LV Ejection Fraction by Kimbrough's Method with a biplane EF of 69 %.   3. Normal left ventricular internal cavity size.   4. Normal left atrial size.   5. Normal right atrial size.   6. Mild mitral valve regurgitation.   7. Mild thickening of the anterior mitral valve leaflet.   8. Mild tricuspid regurgitation.    < end of copied text >  < from: CT Angio Heart and Coronaries w/ IV Cont (22 @ 16:39) >  IMPRESSION:    Focal severe narrowing within the mid RCA.    Mild to moderate LCx ostial narrowing.    Mixed calcified and noncalcified plaque proximal LAD resulting in mild   narrowing.    The total Agatston coronary artery calcium score equals 101, which   corresponds to 72 percentile for age, gender and ethnicity.    < end of copied text >    Imaging or report Personally Reviewed:  [x ] YES  [ ] NO    Medications:  Standing  aspirin  chewable 81 milliGRAM(s) Oral daily  aspirin  chewable 81 milliGRAM(s) Oral daily  atorvastatin 80 milliGRAM(s) Oral at bedtime  chlorhexidine 2% Cloths 1 Application(s) Topical <User Schedule>  dorzolamide 2% Ophthalmic Solution 1 Drop(s) Both EYES <User Schedule>  enoxaparin Injectable 40 milliGRAM(s) SubCutaneous every 24 hours  latanoprost 0.005% Ophthalmic Solution 1 Drop(s) Both EYES at bedtime  metoprolol tartrate 12.5 milliGRAM(s) Oral two times a day  nicotine -  14 mG/24Hr(s) Patch 1 Patch Transdermal daily  sodium chloride 0.9%. 1000 milliLiter(s) IV Continuous <Continuous>  ticagrelor 90 milliGRAM(s) Oral every 12 hours  timolol 0.5% Solution 1 Drop(s) Both EYES every 12 hours    PRN Meds  acetaminophen     Tablet .. 650 milliGRAM(s) Oral every 6 hours PRN      Case discussed with resident    Care discussed with pt/family           KOMAL BOLES  72y Female    CHIEF COMPLAINT:    Patient is a 72y old  Female who presents with a chief complaint of Chest pain (20 Sep 2022 07:12)      INTERVAL HPI/OVERNIGHT EVENTS:    Patient seen and examined. S/P cath and PCI. No cp. No sob. No palpitations. No dizziness.     ROS: All other systems are negative.    Vital Signs:    T(F): 98.4 (22 @ 04:36), Max: 98.4 (22 @ 04:36)  HR: 72 (22 @ 04:36) (66 - 72)  BP: 101/51 (22 @ 04:36) (101/51 - 116/56)  RR: 18 (22 @ 21:16) (18 - 18)  SpO2: --  I&O's Summary    20 Sep 2022 07:01  -  21 Sep 2022 07:00  --------------------------------------------------------  IN: 1500 mL / OUT: 0 mL / NET: 1500 mL      Daily Height in cm: 172.72 (20 Sep 2022 21:16)    Daily Weight in k (20 Sep 2022 21:16)  CAPILLARY BLOOD GLUCOSE          PHYSICAL EXAM:    GENERAL:  NAD  SKIN: No rashes or lesions  HENT: Atraumatic. Normocephalic. PERRL. Moist membranes.  NECK: Supple, No JVD. No lymphadenopathy.  PULMONARY: CTA B/L. No wheezing. No rales  CVS: Normal S1, S2. Rate and Rhythm are regular. No murmurs.  ABDOMEN/GI: Soft, Nontender, Nondistended; BS present  EXTREMITIES: Peripheral pulses intact. No edema B/L LE.  NEUROLOGIC:  No motor or sensory deficit.  PSYCH: Alert & oriented x 3    Consultant(s) Notes Reviewed:  [x ] YES  [ ] NO  Care Discussed with Consultants/Other Providers [ x] YES  [ ] NO    EKG reviewed  Telemetry reviewed    LABS:                        13.3   8.23  )-----------( 194      ( 21 Sep 2022 06:00 )             39.0     09-20    145  |  107  |  19  ----------------------------<  93  4.5   |  27  |  0.8    Ca    9.1      20 Sep 2022 09:19  Mg     2.3         TPro  6.6  /  Alb  4.5  /  TBili  0.5  /  DBili  x   /  AST  20  /  ALT  11  /  AlkPhos  92          Trop 0.01, CKMB --, CK --, 22 @ 20:58  Trop <0.01, CKMB --, CK --, 22 @ 07:49        RADIOLOGY & ADDITIONAL TESTS:    < from: TTE Echo Complete w/o Contrast w/ Doppler (22 @ 07:38) >    Summary:   1. Normal global left ventricular systolic function.   2. LV Ejection Fraction by Kimbrough's Method with a biplane EF of 69 %.   3. Normal left ventricular internal cavity size.   4. Normal left atrial size.   5. Normal right atrial size.   6. Mild mitral valve regurgitation.   7. Mild thickening of the anterior mitral valve leaflet.   8. Mild tricuspid regurgitation.    < end of copied text >  < from: CT Angio Heart and Coronaries w/ IV Cont (22 @ 16:39) >  IMPRESSION:    Focal severe narrowing within the mid RCA.    Mild to moderate LCx ostial narrowing.    Mixed calcified and noncalcified plaque proximal LAD resulting in mild   narrowing.    The total Agatston coronary artery calcium score equals 101, which   corresponds to 72 percentile for age, gender and ethnicity.    < end of copied text >    Imaging or report Personally Reviewed:  [x ] YES  [ ] NO    Medications:  Standing  aspirin  chewable 81 milliGRAM(s) Oral daily  aspirin  chewable 81 milliGRAM(s) Oral daily  atorvastatin 80 milliGRAM(s) Oral at bedtime  chlorhexidine 2% Cloths 1 Application(s) Topical <User Schedule>  dorzolamide 2% Ophthalmic Solution 1 Drop(s) Both EYES <User Schedule>  enoxaparin Injectable 40 milliGRAM(s) SubCutaneous every 24 hours  latanoprost 0.005% Ophthalmic Solution 1 Drop(s) Both EYES at bedtime  metoprolol tartrate 12.5 milliGRAM(s) Oral two times a day  nicotine -  14 mG/24Hr(s) Patch 1 Patch Transdermal daily  sodium chloride 0.9%. 1000 milliLiter(s) IV Continuous <Continuous>  ticagrelor 90 milliGRAM(s) Oral every 12 hours  timolol 0.5% Solution 1 Drop(s) Both EYES every 12 hours    PRN Meds  acetaminophen     Tablet .. 650 milliGRAM(s) Oral every 6 hours PRN      Case discussed with resident    Care discussed with pt/family

## 2022-09-21 NOTE — PROGRESS NOTE ADULT - ASSESSMENT
71yo F w/ pmhx of HLD, tobacco use disorder presents to ED for chest pain. Described as substernal radiating to b/l shoulders and throat. Initially was placed in obs for CCTA which was positive. S/P cath.     [place]  1VCAD s/p PCI  Tobacco use disorder.            PLAN: 71yo F w/ pmhx of HLD, tobacco use disorder presents to ED for chest pain. Described as substernal radiating to b/l shoulders and throat. Initially was placed in obs for CCTA which was positive. S/P cath.     [place]  1VCAD s/p PCI  Tobacco use disorder.            PLAN:    ·	CE x 2 negative  ·	S/P cath and SAMSON in RCA  ·	On ASA, Metoprolol, Lipitor and Ticagrelor  ·	EKG reviewed. A short run of junction escape rhythm. Will d/w the cardiologist.  ·	No events on tele  ·	ECHO showed EF is 69%  ·	Counselled to quit smoking  ·	D/C planning    * Med rec reviewed. Plan of care d/w the pt. Time spent 37 minutes.  71yo F w/ pmhx of HLD, tobacco use disorder presents to ED for chest pain. Described as substernal radiating to b/l shoulders and throat. Initially was placed in obs for CCTA which was positive. S/P cath.     [place]  1VCAD s/p PCI  Tobacco use disorder.            PLAN:    ·	CE x 2 negative  ·	S/P cath and SAMSON in RCA  ·	On ASA, Metoprolol, Lipitor and Ticagrelor  ·	EKG reviewed. A short run of junction escape rhythm. D/W the cardiologist. Recommended to d/c Metoprolol.  ·	No events on tele  ·	ECHO showed EF is 69%  ·	Counselled to quit smoking  ·	D/C home. Cleared by the cardiologist    * Med rec reviewed. Plan of care d/w the pt. Time spent 37 minutes.

## 2022-09-21 NOTE — DISCHARGE NOTE PROVIDER - CARE PROVIDERS DIRECT ADDRESSES
[email or web address]

## 2022-09-21 NOTE — DISCHARGE NOTE PROVIDER - CARE PROVIDER_API CALL
Ramesh Winkler (DO)  Internal Medicine  [address] [place] [address]  Phone: [phone]  Fax: [phone]  Follow Up Time: 2 weeks    Ethan Brito)  Cardiovascular Disease; Internal Medicine  [address] [place] [address]  Phone: [phone]  Fax: [phone]  Follow Up Time: 1 week

## 2022-09-21 NOTE — DISCHARGE NOTE PROVIDER - NSDCMRMEDTOKEN_GEN_ALL_CORE_FT
aspirin 81 mg oral tablet, chewable: 1 tab(s) orally once a day  atorvastatin 80 mg oral tablet: 1 tab(s) orally once a day (at bedtime)  dorzolamide-timolol 2.23%-0.68% ophthalmic solution: 1 drop(s) to each affected eye 2 times a day  latanoprost 0.005% ophthalmic solution: 1 drop(s) to each affected eye once a day (in the evening)  Lidoderm 5% topical film: Apply topically to affected area 2 times a day, As Needed -for moderate pain - for muscle spasm   methocarbamol 750 mg oral tablet: 1 tab(s) orally every 8 hours as needed for pain  metoprolol succinate 25 mg oral tablet, extended release: 1 tab(s) orally once a day  nicotine 14 mg/24 hr transdermal film, extended release:  transdermal   ticagrelor 90 mg oral tablet: 1 tab(s) orally every 12 hours   aspirin 81 mg oral tablet, chewable: 1 tab(s) orally once a day  atorvastatin 80 mg oral tablet: 1 tab(s) orally once a day (at bedtime)  dorzolamide-timolol 2.23%-0.68% ophthalmic solution: 1 drop(s) to each affected eye 2 times a day  latanoprost 0.005% ophthalmic solution: 1 drop(s) to each affected eye once a day (in the evening)  Lidoderm 5% topical film: Apply topically to affected area 2 times a day, As Needed -for moderate pain - for muscle spasm   methocarbamol 750 mg oral tablet: 1 tab(s) orally every 8 hours as needed for pain  nicotine 14 mg/24 hr transdermal film, extended release:  transdermal   ticagrelor 90 mg oral tablet: 1 tab(s) orally every 12 hours MDD:2   aspirin 81 mg oral tablet, chewable: 1 tab(s) orally once a day  atorvastatin 80 mg oral tablet: 1 tab(s) orally once a day (at bedtime)  dorzolamide-timolol 2.23%-0.68% ophthalmic solution: 1 drop(s) to each affected eye 2 times a day  latanoprost 0.005% ophthalmic solution: 1 drop(s) to each affected eye once a day (in the evening)  Lidoderm 5% topical film: Apply topically to affected area 2 times a day, As Needed -for moderate pain - for muscle spasm   methocarbamol 750 mg oral tablet: 1 tab(s) orally every 8 hours as needed for pain  nicotine 14 mg/24 hr transdermal film, extended release: 1  transdermal once a day

## 2022-09-21 NOTE — DISCHARGE NOTE PROVIDER - DISCHARGE DATE
76 y/o M w/ PMH of etoh abuse, dementia, schizophrenia, p/w rigors     #SIRS (w/ Tachycardia, fever) with ALYSSA. No identifiable source of infection.   #Elevated Troponin due to supply/demand ischemia due to above  -Vanco / cefepime  -F/u blood cx  -RVP / COVID19 negative  -UA negative  -CT chest / abdomen does not report any sources of infection  -ESR / CRP  elevated   -IVF  -Lactic acidosis now resolved  -ID consult   -Check CPK level  -ASA  -Trend troponins  -Cardio consult  -Echo  -Tele monitoring   -EKG reviewed       *H/o Etoh abuse  -States he hasn't had any Etoh since 2006    *Renal cyst  -F/u outpatient     *DVT ppx   -Heparin SubQ  21-Sep-2022

## 2022-09-21 NOTE — DISCHARGE NOTE NURSING/CASE MANAGEMENT/SOCIAL WORK - NSDCPEFALRISK_GEN_ALL_CORE
For information on Fall & Injury Prevention, visit: [email or web address] OR  [email or web address] OR  https://www.cdc.gov/steadi/patient.html

## 2022-09-22 PROBLEM — E78.5 HYPERLIPIDEMIA, UNSPECIFIED: Chronic | Status: ACTIVE | Noted: 2022-09-20

## 2022-09-22 PROBLEM — Z00.00 ENCOUNTER FOR PREVENTIVE HEALTH EXAMINATION: Status: ACTIVE | Noted: 2022-09-22

## 2022-10-02 DIAGNOSIS — R07.9 CHEST PAIN, UNSPECIFIED: ICD-10-CM

## 2022-10-02 DIAGNOSIS — F17.210 NICOTINE DEPENDENCE, CIGARETTES, UNCOMPLICATED: ICD-10-CM

## 2022-10-02 DIAGNOSIS — R00.1 BRADYCARDIA, UNSPECIFIED: ICD-10-CM

## 2022-10-02 DIAGNOSIS — I25.84 CORONARY ATHEROSCLEROSIS DUE TO CALCIFIED CORONARY LESION: ICD-10-CM

## 2022-10-02 DIAGNOSIS — I25.110 ATHEROSCLEROTIC HEART DISEASE OF NATIVE CORONARY ARTERY WITH UNSTABLE ANGINA PECTORIS: ICD-10-CM

## 2022-10-02 DIAGNOSIS — E78.5 HYPERLIPIDEMIA, UNSPECIFIED: ICD-10-CM

## 2022-10-02 DIAGNOSIS — H40.9 UNSPECIFIED GLAUCOMA: ICD-10-CM

## 2022-10-03 ENCOUNTER — APPOINTMENT (OUTPATIENT)
Dept: CARDIOLOGY | Facility: CLINIC | Age: 72
End: 2022-10-03

## 2022-10-03 VITALS
SYSTOLIC BLOOD PRESSURE: 124 MMHG | WEIGHT: 127 LBS | BODY MASS INDEX: 19.25 KG/M2 | HEART RATE: 87 BPM | DIASTOLIC BLOOD PRESSURE: 62 MMHG | HEIGHT: 68 IN

## 2022-10-03 PROCEDURE — 93000 ELECTROCARDIOGRAM COMPLETE: CPT

## 2022-10-03 PROCEDURE — 99214 OFFICE O/P EST MOD 30 MIN: CPT | Mod: 25

## 2022-10-16 NOTE — PHYSICAL EXAM
[de-identified] : well appearing, no distress [de-identified] : reg, nL s1/s2, no m/r/g [de-identified] : CTA [de-identified] : right wrist without hematoma, 2+ radial pulse [de-identified] : alert, nL affect, logical conversation

## 2022-10-16 NOTE — DISCUSSION/SUMMARY
[FreeTextEntry1] : Cont ASA / Ticagrelor\par Cont Lipitor\par Smoking cessation (patch recommended)\par Follow-up 3-months.

## 2022-10-16 NOTE — REASON FOR VISIT
[FreeTextEntry1] : 72 year-old female hospitalized last month.\par \par No cardiac history.\par \par Unstable angina s/p PCI RCA.  Mild residual disease.  Uncomplicated course.\par \par Feels well.\par \par No angina.  Breathing comfortable.  No palpitations, lightheadedness, syncope.\par \par Still smoking.\par \par PMH / PSH:\par HLD\par \par SOCIAL:\par Longstanding smoker

## 2022-10-21 ENCOUNTER — NON-APPOINTMENT (OUTPATIENT)
Age: 72
End: 2022-10-21

## 2023-01-23 ENCOUNTER — APPOINTMENT (OUTPATIENT)
Dept: CARDIOLOGY | Facility: CLINIC | Age: 73
End: 2023-01-23
Payer: COMMERCIAL

## 2023-01-23 VITALS
WEIGHT: 129 LBS | HEIGHT: 68 IN | HEART RATE: 80 BPM | SYSTOLIC BLOOD PRESSURE: 124 MMHG | DIASTOLIC BLOOD PRESSURE: 68 MMHG | BODY MASS INDEX: 19.55 KG/M2

## 2023-01-23 PROCEDURE — 93000 ELECTROCARDIOGRAM COMPLETE: CPT

## 2023-01-23 PROCEDURE — 99213 OFFICE O/P EST LOW 20 MIN: CPT | Mod: 25

## 2023-01-29 RX ORDER — ATORVASTATIN CALCIUM 80 MG/1
80 TABLET, FILM COATED ORAL DAILY
Qty: 90 | Refills: 1 | Status: ACTIVE | COMMUNITY
Start: 2023-01-29 | End: 1900-01-01

## 2023-01-29 NOTE — PHYSICAL EXAM
[de-identified] : right wrist without hematoma, 2+ radial pulse [de-identified] : well appearing, no distress [de-identified] : reg, nL s1/s2, no m/r/g [de-identified] : alert, nL affect, logical conversation [de-identified] : CTA

## 2023-01-29 NOTE — REASON FOR VISIT
[FreeTextEntry1] : Feels well.\par \par No angina.  Breathing relatively comfortable.\par \par Still smoking.  Not particularly motivated to quit.\par \par Tolerating Rx.\par

## 2023-01-29 NOTE — DISCUSSION/SUMMARY
[FreeTextEntry1] : Cont ASA / Ticagrelor\par Cont Lipitor\par Smoking cessation (again discussed)\par Follow-up 6-months.

## 2023-07-31 ENCOUNTER — APPOINTMENT (OUTPATIENT)
Dept: CARDIOLOGY | Facility: CLINIC | Age: 73
End: 2023-07-31
Payer: COMMERCIAL

## 2023-07-31 VITALS — WEIGHT: 128 LBS | BODY MASS INDEX: 19.4 KG/M2 | HEIGHT: 68 IN | HEART RATE: 81 BPM

## 2023-07-31 PROCEDURE — 93000 ELECTROCARDIOGRAM COMPLETE: CPT

## 2023-07-31 PROCEDURE — 99214 OFFICE O/P EST MOD 30 MIN: CPT | Mod: 25

## 2023-07-31 NOTE — PHYSICAL EXAM
[de-identified] : well appearing, no distress [de-identified] : reg, nL s1/s2, no m/r/g [de-identified] : CTA [de-identified] : alert, nL affect, logical conversation

## 2023-07-31 NOTE — ASSESSMENT
[FreeTextEntry1] : Unstable angina s/p PCI LAD 1v CAD No angina.  Preserved LVSF.  Palpitations. A-fib risk factors.

## 2023-07-31 NOTE — REASON FOR VISIT
[FreeTextEntry1] : Overall, feels well.  Several episodes of palpitations.  Random.  Last several minutes.  Has experienced before, but remotely.  No associated symptoms.  Breathing relatively comfortable.  Still smoking.  Tolerating medications

## 2023-07-31 NOTE — DISCUSSION/SUMMARY
[FreeTextEntry1] : Cont ASA / Ticagrelor Cont Lipitor Smoking cessation MCOT to define palpitations Follow-up 6-months. [EKG obtained to assist in diagnosis and management of assessed problem(s)] : EKG obtained to assist in diagnosis and management of assessed problem(s)

## 2023-09-08 ENCOUNTER — NON-APPOINTMENT (OUTPATIENT)
Age: 73
End: 2023-09-08

## 2023-10-13 ENCOUNTER — OUTPATIENT (OUTPATIENT)
Dept: OUTPATIENT SERVICES | Facility: HOSPITAL | Age: 73
LOS: 1 days | End: 2023-10-13
Payer: COMMERCIAL

## 2023-10-13 DIAGNOSIS — R91.8 OTHER NONSPECIFIC ABNORMAL FINDING OF LUNG FIELD: ICD-10-CM

## 2023-10-13 DIAGNOSIS — Z98.890 OTHER SPECIFIED POSTPROCEDURAL STATES: Chronic | ICD-10-CM

## 2023-10-13 PROCEDURE — 71046 X-RAY EXAM CHEST 2 VIEWS: CPT | Mod: 26

## 2023-10-13 PROCEDURE — 71046 X-RAY EXAM CHEST 2 VIEWS: CPT

## 2023-10-14 DIAGNOSIS — R91.8 OTHER NONSPECIFIC ABNORMAL FINDING OF LUNG FIELD: ICD-10-CM

## 2023-10-27 ENCOUNTER — APPOINTMENT (OUTPATIENT)
Dept: HEMATOLOGY ONCOLOGY | Facility: CLINIC | Age: 73
End: 2023-10-27
Payer: COMMERCIAL

## 2023-10-27 ENCOUNTER — LABORATORY RESULT (OUTPATIENT)
Age: 73
End: 2023-10-27

## 2023-10-27 ENCOUNTER — OUTPATIENT (OUTPATIENT)
Dept: OUTPATIENT SERVICES | Facility: HOSPITAL | Age: 73
LOS: 1 days | End: 2023-10-27
Payer: COMMERCIAL

## 2023-10-27 VITALS
BODY MASS INDEX: 19.93 KG/M2 | WEIGHT: 130 LBS | DIASTOLIC BLOOD PRESSURE: 67 MMHG | OXYGEN SATURATION: 98 % | RESPIRATION RATE: 16 BRPM | TEMPERATURE: 98.5 F | SYSTOLIC BLOOD PRESSURE: 150 MMHG | HEART RATE: 83 BPM | HEIGHT: 67.72 IN

## 2023-10-27 DIAGNOSIS — D50.9 IRON DEFICIENCY ANEMIA, UNSPECIFIED: ICD-10-CM

## 2023-10-27 DIAGNOSIS — Z79.899 OTHER LONG TERM (CURRENT) DRUG THERAPY: ICD-10-CM

## 2023-10-27 DIAGNOSIS — D64.9 ANEMIA, UNSPECIFIED: ICD-10-CM

## 2023-10-27 DIAGNOSIS — F17.200 NICOTINE DEPENDENCE, UNSPECIFIED, UNCOMPLICATED: ICD-10-CM

## 2023-10-27 DIAGNOSIS — Z86.79 PERSONAL HISTORY OF OTHER DISEASES OF THE CIRCULATORY SYSTEM: ICD-10-CM

## 2023-10-27 DIAGNOSIS — Z98.890 OTHER SPECIFIED POSTPROCEDURAL STATES: Chronic | ICD-10-CM

## 2023-10-27 DIAGNOSIS — Z78.9 OTHER SPECIFIED HEALTH STATUS: ICD-10-CM

## 2023-10-27 LAB
HCT VFR BLD CALC: 37.1 %
HGB BLD-MCNC: 11.8 G/DL
MCHC RBC-ENTMCNC: 27.2 PG
MCHC RBC-ENTMCNC: 31.8 G/DL
MCV RBC AUTO: 85.5 FL
PLATELET # BLD AUTO: 238 K/UL
PMV BLD: 9.5 FL
RBC # BLD: 4.34 M/UL
RBC # FLD: 24.6 %
WBC # FLD AUTO: 5.51 K/UL

## 2023-10-27 PROCEDURE — 99204 OFFICE O/P NEW MOD 45 MIN: CPT

## 2023-10-27 PROCEDURE — 82746 ASSAY OF FOLIC ACID SERUM: CPT

## 2023-10-27 PROCEDURE — 82728 ASSAY OF FERRITIN: CPT

## 2023-10-27 PROCEDURE — 83615 LACTATE (LD) (LDH) ENZYME: CPT

## 2023-10-27 PROCEDURE — 83921 ORGANIC ACID SINGLE QUANT: CPT

## 2023-10-27 PROCEDURE — 83540 ASSAY OF IRON: CPT

## 2023-10-27 PROCEDURE — 82607 VITAMIN B-12: CPT

## 2023-10-27 PROCEDURE — 83550 IRON BINDING TEST: CPT

## 2023-10-27 PROCEDURE — 85027 COMPLETE CBC AUTOMATED: CPT

## 2023-10-28 DIAGNOSIS — D64.9 ANEMIA, UNSPECIFIED: ICD-10-CM

## 2023-10-30 PROBLEM — Z78.9 CAFFEINE USE: Status: ACTIVE | Noted: 2023-10-30

## 2023-10-30 PROBLEM — F17.200 CURRENT EVERY DAY SMOKER: Status: ACTIVE | Noted: 2023-10-30

## 2023-10-30 PROBLEM — Z86.79 HISTORY OF HYPERTENSION: Status: RESOLVED | Noted: 2023-10-30 | Resolved: 2023-10-30

## 2023-10-30 PROBLEM — D50.9 ANEMIA, IRON DEFICIENCY: Status: ACTIVE | Noted: 2023-10-30

## 2023-10-31 PROBLEM — Z79.899 ENCOUNTER FOR MEDICATION MANAGEMENT: Status: ACTIVE | Noted: 2023-10-31

## 2023-10-31 LAB
FERRITIN SERPL-MCNC: 63 NG/ML
FOLATE SERPL-MCNC: 5.2 NG/ML
IRON SATN MFR SERPL: 32 %
IRON SERPL-MCNC: 86 UG/DL
LDH SERPL-CCNC: 200
TIBC SERPL-MCNC: 267 UG/DL
UIBC SERPL-MCNC: 181 UG/DL
VIT B12 SERPL-MCNC: 471 PG/ML

## 2023-11-01 LAB — METHYLMALONATE SERPL-SCNC: 343 NMOL/L

## 2023-11-02 NOTE — ASU PATIENT PROFILE, ADULT - PATIENT KNOW
Problem: Pain  Goal: Verbalizes/displays adequate comfort level or baseline comfort level  11/2/2023 0035 by Paula Rahman RN  Outcome: Progressing  11/1/2023 1904 by García Coleman RN  Outcome: Progressing  Flowsheets (Taken 11/1/2023 1245)  Verbalizes/displays adequate comfort level or baseline comfort level:   Assess pain using appropriate pain scale   Administer analgesics based on type and severity of pain and evaluate response     Problem: Safety - Adult  Goal: Free from fall injury  11/2/2023 0035 by Paula Rahman RN  Outcome: Progressing  11/1/2023 1904 by García Coleman RN  Outcome: Progressing     Problem: Skin/Tissue Integrity  Goal: Absence of new skin breakdown  Description: 1. Monitor for areas of redness and/or skin breakdown  2. Assess vascular access sites hourly  3. Every 4-6 hours minimum:  Change oxygen saturation probe site  4. Every 4-6 hours:  If on nasal continuous positive airway pressure, respiratory therapy assess nares and determine need for appliance change or resting period.   11/2/2023 0035 by Paula Rahman RN  Outcome: Progressing  11/1/2023 1904 by García Coleman RN  Outcome: Progressing     Problem: ABCDS Injury Assessment  Goal: Absence of physical injury  11/2/2023 0035 by Paula Rahman RN  Outcome: Progressing  11/1/2023 1904 by García Coleman RN  Outcome: Progressing     Problem: Chronic Conditions and Co-morbidities  Goal: Patient's chronic conditions and co-morbidity symptoms are monitored and maintained or improved  11/2/2023 0035 by Paula Rahman RN  Outcome: Progressing  11/1/2023 1904 by García Coleman RN  Outcome: Progressing yes

## 2023-11-06 ENCOUNTER — APPOINTMENT (OUTPATIENT)
Dept: CARDIOLOGY | Facility: CLINIC | Age: 73
End: 2023-11-06
Payer: COMMERCIAL

## 2023-11-06 VITALS
SYSTOLIC BLOOD PRESSURE: 144 MMHG | BODY MASS INDEX: 25.52 KG/M2 | HEIGHT: 60 IN | WEIGHT: 130 LBS | DIASTOLIC BLOOD PRESSURE: 82 MMHG | HEART RATE: 68 BPM

## 2023-11-06 DIAGNOSIS — Z01.810 ENCOUNTER FOR PREPROCEDURAL CARDIOVASCULAR EXAMINATION: ICD-10-CM

## 2023-11-06 DIAGNOSIS — Z87.898 PERSONAL HISTORY OF OTHER SPECIFIED CONDITIONS: ICD-10-CM

## 2023-11-06 PROCEDURE — 93000 ELECTROCARDIOGRAM COMPLETE: CPT

## 2023-11-06 PROCEDURE — 99214 OFFICE O/P EST MOD 30 MIN: CPT | Mod: 25

## 2023-11-16 ENCOUNTER — TRANSCRIPTION ENCOUNTER (OUTPATIENT)
Age: 73
End: 2023-11-16

## 2023-11-16 ENCOUNTER — OUTPATIENT (OUTPATIENT)
Dept: OUTPATIENT SERVICES | Facility: HOSPITAL | Age: 73
LOS: 1 days | Discharge: HOME | End: 2023-11-16
Payer: COMMERCIAL

## 2023-11-16 VITALS
DIASTOLIC BLOOD PRESSURE: 73 MMHG | SYSTOLIC BLOOD PRESSURE: 154 MMHG | TEMPERATURE: 98 F | HEART RATE: 70 BPM | RESPIRATION RATE: 18 BRPM | WEIGHT: 130.07 LBS | HEIGHT: 68 IN

## 2023-11-16 VITALS
DIASTOLIC BLOOD PRESSURE: 65 MMHG | HEART RATE: 61 BPM | RESPIRATION RATE: 18 BRPM | OXYGEN SATURATION: 98 % | SYSTOLIC BLOOD PRESSURE: 147 MMHG

## 2023-11-16 DIAGNOSIS — Z95.5 PRESENCE OF CORONARY ANGIOPLASTY IMPLANT AND GRAFT: Chronic | ICD-10-CM

## 2023-11-16 DIAGNOSIS — Z98.890 OTHER SPECIFIED POSTPROCEDURAL STATES: Chronic | ICD-10-CM

## 2023-11-16 DIAGNOSIS — Z12.11 ENCOUNTER FOR SCREENING FOR MALIGNANT NEOPLASM OF COLON: ICD-10-CM

## 2023-11-16 PROCEDURE — 88305 TISSUE EXAM BY PATHOLOGIST: CPT

## 2023-11-16 PROCEDURE — 88305 TISSUE EXAM BY PATHOLOGIST: CPT | Mod: 26

## 2023-11-16 NOTE — CHART NOTE - NSCHARTNOTEFT_GEN_A_CORE
PACU ANESTHESIA ADMISSION NOTE      Procedure: colonoscopy with biopsy  Post op diagnosis: screening for colon cancer      ____  Intubated  TV:______       Rate: ______      FiO2: ______    _x___  Patent Airway    _x___  Full return of protective reflexes    _x___  Full recovery from anesthesia / back to baseline     Vitals:   T:  98         R:15                  BP:  143/72                Sat: 97                   P: 72      Mental Status:  ___x_ Awake   _____ Alert   _____ Drowsy   _____ Sedated    Nausea/Vomiting:  __x__ NO  ______Yes,   See Post - Op Orders          Pain Scale (0-10):  _0____    Treatment: ____ None    ____ See Post - Op/PCA Orders    Post - Operative Fluids:   __x__ Oral   ____ See Post - Op Orders    Plan: Discharge:   __x__Home       _____Floor     _____Critical Care    _____  Other:_________________    Comments: patient tolerated procedure well

## 2023-11-16 NOTE — ASU PATIENT PROFILE, ADULT - NSICDXPASTMEDICALHX_GEN_ALL_CORE_FT
PAST MEDICAL HISTORY:  CAD (coronary artery disease)     Glaucoma of both eyes, unspecified glaucoma type     Hyperlipidemia

## 2023-11-16 NOTE — ASU PATIENT PROFILE, ADULT - FALL HARM RISK - HARM RISK INTERVENTIONS

## 2023-11-17 PROBLEM — I25.10 ATHEROSCLEROTIC HEART DISEASE OF NATIVE CORONARY ARTERY WITHOUT ANGINA PECTORIS: Chronic | Status: ACTIVE | Noted: 2023-11-16

## 2023-11-17 LAB
SURGICAL PATHOLOGY STUDY: SIGNIFICANT CHANGE UP
SURGICAL PATHOLOGY STUDY: SIGNIFICANT CHANGE UP

## 2023-11-22 DIAGNOSIS — I25.10 ATHEROSCLEROTIC HEART DISEASE OF NATIVE CORONARY ARTERY WITHOUT ANGINA PECTORIS: ICD-10-CM

## 2023-11-22 DIAGNOSIS — Z12.11 ENCOUNTER FOR SCREENING FOR MALIGNANT NEOPLASM OF COLON: ICD-10-CM

## 2023-11-22 DIAGNOSIS — Z95.5 PRESENCE OF CORONARY ANGIOPLASTY IMPLANT AND GRAFT: ICD-10-CM

## 2023-11-22 DIAGNOSIS — F17.200 NICOTINE DEPENDENCE, UNSPECIFIED, UNCOMPLICATED: ICD-10-CM

## 2023-11-22 DIAGNOSIS — Z88.6 ALLERGY STATUS TO ANALGESIC AGENT: ICD-10-CM

## 2023-11-22 DIAGNOSIS — Z79.82 LONG TERM (CURRENT) USE OF ASPIRIN: ICD-10-CM

## 2023-11-22 DIAGNOSIS — K64.8 OTHER HEMORRHOIDS: ICD-10-CM

## 2023-11-22 DIAGNOSIS — D12.5 BENIGN NEOPLASM OF SIGMOID COLON: ICD-10-CM

## 2023-11-22 DIAGNOSIS — E78.5 HYPERLIPIDEMIA, UNSPECIFIED: ICD-10-CM

## 2023-11-22 DIAGNOSIS — K57.30 DIVERTICULOSIS OF LARGE INTESTINE WITHOUT PERFORATION OR ABSCESS WITHOUT BLEEDING: ICD-10-CM

## 2023-11-22 DIAGNOSIS — Q27.33 ARTERIOVENOUS MALFORMATION OF DIGESTIVE SYSTEM VESSEL: ICD-10-CM

## 2023-12-01 ENCOUNTER — OUTPATIENT (OUTPATIENT)
Dept: OUTPATIENT SERVICES | Facility: HOSPITAL | Age: 73
LOS: 1 days | End: 2023-12-01
Payer: COMMERCIAL

## 2023-12-01 DIAGNOSIS — Z95.5 PRESENCE OF CORONARY ANGIOPLASTY IMPLANT AND GRAFT: Chronic | ICD-10-CM

## 2023-12-01 DIAGNOSIS — Z98.890 OTHER SPECIFIED POSTPROCEDURAL STATES: Chronic | ICD-10-CM

## 2023-12-01 DIAGNOSIS — Z12.31 ENCOUNTER FOR SCREENING MAMMOGRAM FOR MALIGNANT NEOPLASM OF BREAST: ICD-10-CM

## 2023-12-01 PROCEDURE — 77067 SCR MAMMO BI INCL CAD: CPT

## 2023-12-01 PROCEDURE — 77063 BREAST TOMOSYNTHESIS BI: CPT | Mod: 26

## 2023-12-01 PROCEDURE — 77067 SCR MAMMO BI INCL CAD: CPT | Mod: 26

## 2023-12-01 PROCEDURE — 77063 BREAST TOMOSYNTHESIS BI: CPT

## 2023-12-02 DIAGNOSIS — Z12.31 ENCOUNTER FOR SCREENING MAMMOGRAM FOR MALIGNANT NEOPLASM OF BREAST: ICD-10-CM

## 2024-01-07 PROBLEM — Z87.898 HISTORY OF PALPITATIONS: Status: RESOLVED | Noted: 2023-07-31 | Resolved: 2024-01-07

## 2024-01-07 PROBLEM — Z01.810 PREOPERATIVE CARDIOVASCULAR EXAMINATION: Status: RESOLVED | Noted: 2023-11-06 | Resolved: 2024-01-07

## 2024-01-07 NOTE — DISCUSSION/SUMMARY
[EKG obtained to assist in diagnosis and management of assessed problem(s)] : EKG obtained to assist in diagnosis and management of assessed problem(s) [FreeTextEntry1] : Cont ASA / Ticagrelor Cont Lipitor Continue Cardizem Smoking cessation Follow-up 6-months.

## 2024-01-07 NOTE — ASSESSMENT
[FreeTextEntry1] : Unstable angina s/p PCI LAD 1v CAD No angina. Preserved LVSF.  Paroxysmal SVT Palpitations improved on Cardizem

## 2024-01-07 NOTE — REASON FOR VISIT
[FreeTextEntry1] : Feels well.  MCOT demonstrated less than 1% ectopy.  Few runs of SVT.  Cardizem started.  Symptoms improved.  No angina.  Breathing relatively comfortable.  Still smoking.  Tolerating medications.

## 2024-01-07 NOTE — PHYSICAL EXAM
[de-identified] : reg, nL s1/s2, no m/r/g [de-identified] : well appearing, no distress [de-identified] : CTA [de-identified] : alert, nL affect, logical conversation

## 2024-01-07 NOTE — PHYSICAL EXAM
[de-identified] : well appearing, no distress [de-identified] : reg, nL s1/s2, no m/r/g [de-identified] : CTA [de-identified] : alert, nL affect, logical conversation

## 2024-01-18 ENCOUNTER — APPOINTMENT (OUTPATIENT)
Dept: HEMATOLOGY ONCOLOGY | Facility: CLINIC | Age: 74
End: 2024-01-18

## 2024-01-18 ENCOUNTER — OUTPATIENT (OUTPATIENT)
Dept: OUTPATIENT SERVICES | Facility: HOSPITAL | Age: 74
LOS: 1 days | End: 2024-01-18
Payer: COMMERCIAL

## 2024-01-18 ENCOUNTER — RX RENEWAL (OUTPATIENT)
Age: 74
End: 2024-01-18

## 2024-01-18 ENCOUNTER — LABORATORY RESULT (OUTPATIENT)
Age: 74
End: 2024-01-18

## 2024-01-18 DIAGNOSIS — Z95.5 PRESENCE OF CORONARY ANGIOPLASTY IMPLANT AND GRAFT: Chronic | ICD-10-CM

## 2024-01-18 DIAGNOSIS — D50.9 IRON DEFICIENCY ANEMIA, UNSPECIFIED: ICD-10-CM

## 2024-01-18 DIAGNOSIS — Z98.890 OTHER SPECIFIED POSTPROCEDURAL STATES: Chronic | ICD-10-CM

## 2024-01-18 LAB
HCT VFR BLD CALC: 41.3 %
HGB BLD-MCNC: 13.8 G/DL
MCHC RBC-ENTMCNC: 29.5 PG
MCHC RBC-ENTMCNC: 33.4 G/DL
MCV RBC AUTO: 88.2 FL
PLATELET # BLD AUTO: 195 K/UL
PMV BLD: 9.1 FL
RBC # BLD: 4.68 M/UL
RBC # FLD: 13.7 %
WBC # FLD AUTO: 6.58 K/UL

## 2024-01-18 PROCEDURE — 82607 VITAMIN B-12: CPT

## 2024-01-18 PROCEDURE — 83540 ASSAY OF IRON: CPT

## 2024-01-18 PROCEDURE — 83550 IRON BINDING TEST: CPT

## 2024-01-18 PROCEDURE — 85027 COMPLETE CBC AUTOMATED: CPT

## 2024-01-18 PROCEDURE — 82728 ASSAY OF FERRITIN: CPT

## 2024-01-19 DIAGNOSIS — D50.9 IRON DEFICIENCY ANEMIA, UNSPECIFIED: ICD-10-CM

## 2024-01-19 LAB
FERRITIN SERPL-MCNC: 108 NG/ML
IRON SATN MFR SERPL: 39 %
IRON SERPL-MCNC: 103 UG/DL
TIBC SERPL-MCNC: 267 UG/DL
UIBC SERPL-MCNC: 164 UG/DL
VIT B12 SERPL-MCNC: 624 PG/ML

## 2024-02-13 ENCOUNTER — RX RENEWAL (OUTPATIENT)
Age: 74
End: 2024-02-13

## 2024-02-13 RX ORDER — CHLORHEXIDINE GLUCONATE 4 %
1000 LIQUID (ML) TOPICAL
Qty: 45 | Refills: 0 | Status: ACTIVE | COMMUNITY
Start: 2024-02-13 | End: 1900-01-01

## 2024-03-25 ENCOUNTER — RX RENEWAL (OUTPATIENT)
Age: 74
End: 2024-03-25

## 2024-03-25 RX ORDER — FOLIC ACID 1 MG/1
1 TABLET ORAL EVERY OTHER DAY
Qty: 15 | Refills: 2 | Status: ACTIVE | COMMUNITY
Start: 2023-10-31 | End: 1900-01-01

## 2024-04-16 ENCOUNTER — LABORATORY RESULT (OUTPATIENT)
Age: 74
End: 2024-04-16

## 2024-04-16 ENCOUNTER — APPOINTMENT (OUTPATIENT)
Age: 74
End: 2024-04-16

## 2024-04-16 ENCOUNTER — OUTPATIENT (OUTPATIENT)
Dept: OUTPATIENT SERVICES | Facility: HOSPITAL | Age: 74
LOS: 1 days | End: 2024-04-16
Payer: MEDICARE

## 2024-04-16 DIAGNOSIS — Z95.5 PRESENCE OF CORONARY ANGIOPLASTY IMPLANT AND GRAFT: Chronic | ICD-10-CM

## 2024-04-16 DIAGNOSIS — Z98.890 OTHER SPECIFIED POSTPROCEDURAL STATES: Chronic | ICD-10-CM

## 2024-04-16 DIAGNOSIS — D50.9 IRON DEFICIENCY ANEMIA, UNSPECIFIED: ICD-10-CM

## 2024-04-16 LAB
HCT VFR BLD CALC: 42.9 %
HGB BLD-MCNC: 14.3 G/DL
IRON SATN MFR SERPL: 32 %
IRON SERPL-MCNC: 79 UG/DL
MCHC RBC-ENTMCNC: 30.8 PG
MCHC RBC-ENTMCNC: 33.3 G/DL
MCV RBC AUTO: 92.5 FL
PLATELET # BLD AUTO: 194 K/UL
PMV BLD: 9.5 FL
RBC # BLD: 4.64 M/UL
RBC # FLD: 13.7 %
TIBC SERPL-MCNC: 250 UG/DL
UIBC SERPL-MCNC: 171 UG/DL
WBC # FLD AUTO: 6.98 K/UL

## 2024-04-16 PROCEDURE — 82728 ASSAY OF FERRITIN: CPT

## 2024-04-16 PROCEDURE — 36415 COLL VENOUS BLD VENIPUNCTURE: CPT

## 2024-04-16 PROCEDURE — 83540 ASSAY OF IRON: CPT

## 2024-04-16 PROCEDURE — 85027 COMPLETE CBC AUTOMATED: CPT

## 2024-04-16 PROCEDURE — 83550 IRON BINDING TEST: CPT

## 2024-04-17 DIAGNOSIS — D50.9 IRON DEFICIENCY ANEMIA, UNSPECIFIED: ICD-10-CM

## 2024-04-17 LAB — FERRITIN SERPL-MCNC: 253 NG/ML

## 2024-05-06 ENCOUNTER — APPOINTMENT (OUTPATIENT)
Dept: CARDIOLOGY | Facility: CLINIC | Age: 74
End: 2024-05-06
Payer: MEDICARE

## 2024-05-06 VITALS
DIASTOLIC BLOOD PRESSURE: 80 MMHG | HEIGHT: 60 IN | WEIGHT: 133 LBS | BODY MASS INDEX: 26.11 KG/M2 | HEART RATE: 75 BPM | SYSTOLIC BLOOD PRESSURE: 118 MMHG

## 2024-05-06 DIAGNOSIS — I47.10 SUPRAVENTRICULAR TACHYCARDIA, UNSPECIFIED: ICD-10-CM

## 2024-05-06 DIAGNOSIS — I25.10 ATHEROSCLEROTIC HEART DISEASE OF NATIVE CORONARY ARTERY W/OUT ANGINA PECTORIS: ICD-10-CM

## 2024-05-06 PROCEDURE — 93000 ELECTROCARDIOGRAM COMPLETE: CPT

## 2024-05-06 PROCEDURE — 99214 OFFICE O/P EST MOD 30 MIN: CPT

## 2024-05-06 RX ORDER — OMEGA-3/DHA/EPA/FISH OIL 35-113.5MG
1000 TABLET,CHEWABLE ORAL EVERY OTHER DAY
Qty: 45 | Refills: 0 | Status: DISCONTINUED | COMMUNITY
Start: 2023-10-31 | End: 2024-05-06

## 2024-05-06 RX ORDER — DILTIAZEM HYDROCHLORIDE 120 MG/1
120 CAPSULE, EXTENDED RELEASE ORAL DAILY
Qty: 90 | Refills: 1 | Status: DISCONTINUED | COMMUNITY
Start: 2023-09-18 | End: 2024-05-06

## 2024-05-06 RX ORDER — TICAGRELOR 90 MG/1
90 TABLET ORAL TWICE DAILY
Qty: 180 | Refills: 1 | Status: DISCONTINUED | COMMUNITY
Start: 2022-10-17 | End: 2024-05-06

## 2024-05-06 NOTE — PHYSICAL EXAM
[de-identified] : well appearing, no distress [de-identified] : reg, nL s1/s2, no m/r/g [de-identified] : CTA [de-identified] : alert, nL affect, logical conversation Simple: Patient demonstrates quick and easy understanding

## 2024-05-06 NOTE — REASON FOR VISIT
[FreeTextEntry1] : Daughter  this winter. Retired in March.  No interval cardiac symptoms.  Palpitations improved.  Remain rare and brief.  Not taking diltiazem.  Taking aspirin.  No ticagrelor.  Also taking iron, folate, and B12 replacement.  CBC in April normal.  Had colonoscopy.  Apparently unremarkable except for polyp.  Tolerating medications.

## 2024-05-06 NOTE — DISCUSSION/SUMMARY
[FreeTextEntry1] : Cont ASA Cont Lipitor Follow-up 6-months. [EKG obtained to assist in diagnosis and management of assessed problem(s)] : EKG obtained to assist in diagnosis and management of assessed problem(s)

## 2024-05-06 NOTE — ASSESSMENT
[FreeTextEntry1] : Unstable angina s/p PCI LAD 1v CAD No angina. Preserved LVSF.  Paroxysmal SVT Palpitations improved

## 2024-05-06 NOTE — HISTORY OF PRESENT ILLNESS
[FreeTextEntry1] : 72 year-old female hospitalized last month.  No cardiac history.  Unstable angina s/p PCI RCA.  Mild residual disease.  Uncomplicated course.  Feels well.  No angina.  Breathing comfortable.  No palpitations, lightheadedness, syncope.  Still smoking.  PMH / PSH: HLD  SOCIAL: Longstanding smoker

## 2024-07-15 ENCOUNTER — LABORATORY RESULT (OUTPATIENT)
Age: 74
End: 2024-07-15

## 2024-07-15 ENCOUNTER — APPOINTMENT (OUTPATIENT)
Age: 74
End: 2024-07-15
Payer: MEDICARE

## 2024-07-15 ENCOUNTER — RX RENEWAL (OUTPATIENT)
Age: 74
End: 2024-07-15

## 2024-07-15 ENCOUNTER — OUTPATIENT (OUTPATIENT)
Dept: OUTPATIENT SERVICES | Facility: HOSPITAL | Age: 74
LOS: 1 days | End: 2024-07-15
Payer: MEDICARE

## 2024-07-15 VITALS
RESPIRATION RATE: 16 BRPM | WEIGHT: 136 LBS | HEIGHT: 66 IN | TEMPERATURE: 98.1 F | SYSTOLIC BLOOD PRESSURE: 116 MMHG | DIASTOLIC BLOOD PRESSURE: 71 MMHG | BODY MASS INDEX: 21.86 KG/M2 | HEART RATE: 80 BPM

## 2024-07-15 DIAGNOSIS — Z95.5 PRESENCE OF CORONARY ANGIOPLASTY IMPLANT AND GRAFT: Chronic | ICD-10-CM

## 2024-07-15 DIAGNOSIS — Z98.890 OTHER SPECIFIED POSTPROCEDURAL STATES: Chronic | ICD-10-CM

## 2024-07-15 DIAGNOSIS — D50.9 IRON DEFICIENCY ANEMIA, UNSPECIFIED: ICD-10-CM

## 2024-07-15 LAB
HCT VFR BLD CALC: 42.8 %
HGB BLD-MCNC: 14.2 G/DL
IRON SATN MFR SERPL: 37 %
IRON SERPL-MCNC: 85 UG/DL
MCHC RBC-ENTMCNC: 30.7 PG
MCHC RBC-ENTMCNC: 33.2 G/DL
MCV RBC AUTO: 92.6 FL
PLATELET # BLD AUTO: 192 K/UL
PMV BLD: 9.5 FL
RBC # BLD: 4.62 M/UL
RBC # FLD: 13.3 %
TIBC SERPL-MCNC: 227 UG/DL
UIBC SERPL-MCNC: 142 UG/DL
WBC # FLD AUTO: 6.75 K/UL

## 2024-07-15 PROCEDURE — 99203 OFFICE O/P NEW LOW 30 MIN: CPT

## 2024-07-15 PROCEDURE — 83540 ASSAY OF IRON: CPT

## 2024-07-15 PROCEDURE — 99213 OFFICE O/P EST LOW 20 MIN: CPT

## 2024-07-15 PROCEDURE — 83550 IRON BINDING TEST: CPT

## 2024-07-15 PROCEDURE — 82728 ASSAY OF FERRITIN: CPT

## 2024-07-15 PROCEDURE — 85027 COMPLETE CBC AUTOMATED: CPT

## 2024-07-16 DIAGNOSIS — D50.9 IRON DEFICIENCY ANEMIA, UNSPECIFIED: ICD-10-CM

## 2024-07-16 LAB — FERRITIN SERPL-MCNC: 282 NG/ML

## 2024-08-02 NOTE — ASU PATIENT PROFILE, ADULT - MEDICATIONS BROUGHT TO HOSPITAL, PROFILE
"CARDIOLOGY FOLLOW-UP PROGRESS NOTE      Reason for follow-up:    HFrEF  AS  MR  Bradycardia     Attending: Kelly Rodriguez MD      Subjective .     Denies chest pain or dyspnea at present  [place]    Creatinine up slightly with dialysis evidence versus low blood pressures yesterday  Replacing potassium today  Blood pressures are better although remains bradycardic    Continue midodrine and diuretic efforts       ROS  Pertinent items are noted in HPI, all other systems reviewed and negative  Allergies: Codeine, Latex, and Sulfa antibiotics    Scheduled Meds:aspirin, 81 mg, Oral, Daily  atorvastatin, 40 mg, Oral, Nightly  midodrine, 5 mg, Oral, TID AC  multivitamin with minerals, 1 tablet, Oral, Daily  pantoprazole, 40 mg, Oral, Q AM  sertraline, 25 mg, Oral, Daily  sodium chloride, 10 mL, Intravenous, Q12H  traMADol, 50 mg, Oral, Nightly        Continuous Infusions:     PRN Meds:.  acetaminophen **OR** acetaminophen **OR** acetaminophen    albuterol    senna-docusate sodium **AND** polyethylene glycol **AND** bisacodyl **AND** bisacodyl    melatonin    nitroglycerin    ondansetron    sodium chloride    sodium chloride    sodium chloride    Objective     VITAL SIGNS  Patient Vitals for the past 24 hrs:   BP Temp Temp src Pulse Resp SpO2 Height Weight   08/02/24 0828 139/74 -- -- 61 17 98 % -- --   08/01/24 2214 134/67 98.3 °F (36.8 °C) Oral 61 14 100 % 167.6 cm (66\") 59.9 kg (132 lb 0.9 oz)   08/01/24 1646 109/43 98.1 °F (36.7 °C) -- 68 17 -- -- --   08/01/24 1500 90/54 96.3 °F (35.7 °C) Oral 63 16 100 % -- --   08/01/24 1447 90/54 -- -- -- -- -- -- --        Flowsheet Rows      Flowsheet Row First Filed Value   Admission Height 170.2 cm (67\") Documented at 07/31/2024 2330   Admission Weight 57.7 kg (127 lb 3.3 oz) Documented at 08/01/2024 0447            Body mass index is 21.31 kg/m².      Intake/Output Summary (Last 24 hours) at 8/2/2024 1100  Last data filed at 8/2/2024 0900  Gross per 24 hour   Intake 240 ml " "  Output 1450 ml   Net -1210 ml        TELEMETRY:     SB/SR    Physical Exam:  Vitals reviewed.   Constitutional:       General: Not in acute distress.     Appearance: Normal appearance. Well-developed.   Eyes:      Pupils: Pupils are equal, round, and reactive to light.   HENT:      Head: Normocephalic and atraumatic.   Neck:      Vascular: No JVD.   Pulmonary:      Effort: Pulmonary effort is normal.      Breath sounds: Normal breath sounds.   Cardiovascular:      Normal rate. Regular rhythm.      Murmurs: There is a systolic murmur.   Pulses:     Intact distal pulses.   Edema:     Peripheral edema absent.   Abdominal:      General: There is no distension.      Palpations: Abdomen is soft.      Tenderness: There is no abdominal tenderness.   Musculoskeletal: Normal range of motion.      Cervical back: Normal range of motion and neck supple. Skin:     General: Skin is warm and dry.   Neurological:      Mental Status: Alert.            Results Review:   I reviewed the patient's new clinical results.    CBC    Results from last 7 days   Lab Units 08/02/24  0306 08/01/24  0014   WBC 10*3/mm3 4.71 8.95   HEMOGLOBIN g/dL 9.2* 11.4*   PLATELETS 10*3/mm3 113* 129*     BMP   Results from last 7 days   Lab Units 08/02/24  0306 08/01/24 0417 08/01/24  0014   SODIUM mmol/L 139 141 139   POTASSIUM mmol/L 3.4* 3.7 4.2   CHLORIDE mmol/L 103 106 104   CO2 mmol/L 28.7 25.5 24.4   BUN mg/dL 31* 22 22   CREATININE mg/dL 1.56* 1.26 1.41*   GLUCOSE mg/dL 88 85 86     Cr Clearance Estimated Creatinine Clearance: 29.3 mL/min (A) (by C-G formula based on SCr of 1.56 mg/dL (H)).  Coag     HbA1C   Lab Results   Component Value Date    HGBA1C 5.5 06/01/2021     Blood Glucose No results found for: \"POCGLU\"  Infection   Results from last 7 days   Lab Units 08/01/24  0147 08/01/24  0014   BLOODCX  No growth at 24 hours No growth at 24 hours     CMP   Results from last 7 days   Lab Units 08/02/24  0306 08/01/24  0417 08/01/24  0014   SODIUM " "mmol/L 139 141 139   POTASSIUM mmol/L 3.4* 3.7 4.2   CHLORIDE mmol/L 103 106 104   CO2 mmol/L 28.7 25.5 24.4   BUN mg/dL 31* 22 22   CREATININE mg/dL 1.56* 1.26 1.41*   GLUCOSE mg/dL 88 85 86   ALBUMIN g/dL  --   --  3.5   BILIRUBIN mg/dL  --   --  0.7   ALK PHOS U/L  --   --  149*   AST (SGOT) U/L  --   --  32   ALT (SGPT) U/L  --   --  10     ABG    Results from last 7 days   Lab Units 08/01/24  0014   PH, ARTERIAL pH units 7.339*   PCO2, ARTERIAL mm Hg 48.9*   PO2 ART mm Hg 103.4   O2 SATURATION ART % 97.5   BASE EXCESS ART mmol/L 0.2     UA    Results from last 7 days   Lab Units 08/01/24  0207   NITRITE UA  Negative   WBC UA /HPF 3-5*   BACTERIA UA /HPF None Seen   SQUAM EPITHEL UA /HPF 3-6*     BALJEET  No results found for: \"POCMETH\", \"POCAMPHET\", \"POCBARBITUR\", \"POCBENZO\", \"POCCOCAINE\", \"POCOPIATES\", \"POCOXYCODO\", \"POCPHENCYC\", \"POCPROPOXY\", \"POCTHC\", \"POCTRICYC\"  Lysis Labs   Results from last 7 days   Lab Units 08/02/24  0306 08/01/24  0417 08/01/24  0014   HEMOGLOBIN g/dL 9.2*  --  11.4*   PLATELETS 10*3/mm3 113*  --  129*   CREATININE mg/dL 1.56* 1.26 1.41*     Radiology(recent) CT Abdomen Pelvis With & Without Contrast    Result Date: 8/2/2024  Impression: 1. Indeterminate 1.9 cm lesion at the lateral cortical margin of the right mid kidney. Given its stability and size and morphology since the CT abdomen of 1/6/2020, it would favor a benign etiology. However, on today's examination, it does appear to demonstrate contrast enhancement, which would favor malignant etiology. Any further decision as to whether to continue imaging surveillance versus soft tissue biopsy should be based upon clinical assessment for this 87-year-old patient. 2. Bilateral renal cysts. 3. Heavy calcific atherosclerosis within the bilateral renal arteries, left greater than right. Left renal artery stenosis not excluded. 4.. Mild to moderate left renal atrophy with multifocal cortical scarring. 5. Tiny nonobstructing right renal " stone. 6. Moderate right pleural effusion with right basilar atelectasis. Chronic left basilar pleural effusion and chronic rounded to left basilar atelectasis. 7. Sigmoid colonic diverticulosis. Electronically Signed: Abigail William MD  8/2/2024 10:29 AM EDT  Workstation ID: [id number]    CT Head Without Contrast    Result Date: 8/1/2024  Impression: 1.No acute intracranial abnormality. 2.Stable mild to moderate chronic small vessel ischemic change and generalized parenchymal volume loss. 3.Mild paranasal sinus mucosal disease status post antrostomy. Electronically Signed: Sadiq Reza MD  8/1/2024 1:22 AM EDT  Workstation ID: [id number]    XR Chest 1 View    Result Date: 8/1/2024  Impression: Worsening moderate pulmonary edema with stable small bilateral pleural effusions. Electronically Signed: Sadiq Reza MD  8/1/2024 12:58 AM EDT  Workstation ID: [id number]     Imaging Results (Last 24 Hours)       Procedure Component Value Units Date/Time    CT Abdomen Pelvis With & Without Contrast [[id number]] Collected: 08/02/24 1018     Updated: 08/02/24 1031    Narrative:      CT ABDOMEN PELVIS W WO CONTRAST    Date of Exam: 8/2/2024 9:57 AM EDT    Indication: S hematuria.    Comparison: Renal ultrasound 4/20/2024. CT abdomen and pelvis with contrast 1/6/2020.    Technique: Axial CT images were obtained of the abdomen and pelvis before and after the uneventful intravenous administration of iodinated contrast. Sagittal and coronal reconstructions were performed.  Automated exposure control and iterative   reconstruction methods were used.      Findings:  No left renal stone is seen. Benign left renal hilar vascular calcification and benign left renal cortical calcifications are present. There is a punctate nonobstructing stone within the right mid kidney. No ureteral stone is identified.    A 1.9 cm hyperdense lesion is seen in the lateral cortex of the right mid kidney (noncontrast Hounsfield unit 73.6). It appears to  demonstrate contrast enhancement (corticomedullary phase postcontrast Hounsfield units 89.6, delayed 5-minute phase of   postcontrast Hounsfield units of 88.6), elevating index of suspicion for potential malignancy. However, the 1/6/2020 CT abdomen demonstrates a similar size and shape hyperdense lesion in the same location.    Simple bilateral renal cysts are present, largest at the lower pole measuring 3.7 cm. There is multifocal left renal cortical scarring and mild to moderate left renal atrophy.    The urinary bladder is within normal limits.    The liver, spleen, adrenals are within normal limits. Uncomplicated cholelithiasis. Heavy calcific atherosclerosis within the abdominal aorta, proximal bilateral renal arteries. Pancreas is mildly atrophic with signs of chronic calcific pancreatitis.    The stomach, and the large and small bowel, do not appear abnormally thickened, dilated or inflamed. Advanced uncomplicated diverticular changes are present of the sigmoid colon. There is small pelvic ascites. There is generalized body wall edema.    Moderate right and small left pleural effusions are present. The left pleural effusion appears chronic, loculated, with wall calcification. There is chronic rounded atelectasis within the left lower lobe. There is mild atelectasis in the right middle   lobe and right lower lobe.    Heart size is moderately enlarged. Coronary artery calcifications are present. Median sternotomy changes are seen. There is a mild aortic valve calcification.    Right hip replacement changes are noted. Lumbar levoscoliosis. Degenerative disc endplate changes and facet arthropathy within the spine. No acute or suspicious osseous abnormalities.        Impression:      Impression:    1. Indeterminate 1.9 cm lesion at the lateral cortical margin of the right mid kidney. Given its stability and size and morphology since the CT abdomen of 1/6/2020, it would favor a benign etiology. However, on today's  examination, it does appear to   demonstrate contrast enhancement, which would favor malignant etiology. Any further decision as to whether to continue imaging surveillance versus soft tissue biopsy should be based upon clinical assessment for this 87-year-old patient.  2. Bilateral renal cysts.  3. Heavy calcific atherosclerosis within the bilateral renal arteries, left greater than right. Left renal artery stenosis not excluded.  4.. Mild to moderate left renal atrophy with multifocal cortical scarring.  5. Tiny nonobstructing right renal stone.  6. Moderate right pleural effusion with right basilar atelectasis. Chronic left basilar pleural effusion and chronic rounded to left basilar atelectasis.  7. Sigmoid colonic diverticulosis.        Electronically Signed: Abigail William MD    8/2/2024 10:29 AM EDT    Workstation ID: [id number]            Results from last 7 days   Lab Units 08/01/24  0147   HSTROP T ng/L 137*       EKG               I personally viewed and interpreted the patient's EKG/Telemetry data:        ECHOCARDIOGRAM:     Results for orders placed during the hospital encounter of 07/31/24    Adult Transthoracic Echo Complete W/ Cont if Necessary Per Protocol    Interpretation Summary    Left ventricular systolic function is normal. Left ventricular ejection fraction appears to be 31 - 35%.    The left ventricular cavity is borderline dilated.    Left ventricular wall thickness is consistent with mild concentric hypertrophy.    Left ventricular diastolic function is consistent with (grade II w/high LAP) pseudonormalization.    Mildly reduced right ventricular systolic function noted.    The right ventricular cavity is borderline dilated.    The left atrial cavity is severely dilated.    Left atrial volume is severely increased.    The right atrial cavity is severely  dilated.    Severe aortic valve stenosis is present.    Abnormal mitral valve structure consistent with dilated annulus.    Moderate to  severe mitral valve regurgitation is present with an eccentric jet noted.    Estimated right ventricular systolic pressure from tricuspid regurgitation is mildly elevated (35-45 mmHg).    Mild pulmonary hypertension is present.        STRESS MYOVIEW:      CARDIAC CATHETERIZATION:      OTHER:         Assessment & Plan            CHF exacerbation    CAD (coronary artery disease)    Depression, unspecified    Heart failure, unspecified    Chronic obstructive pulmonary disease, unspecified        ASSESSMENT:    HFrEF  Previous echocardiogram EF 40 to 45%, now down to around 30% with regional abnormalities as described, akinesis of the posterior wall inferolateral and high lateral wall, global hypokinesis otherwise  Elevated filling pressures, grade 2 diastolic dysfunction  Mild pulmonary hypertension  Likely low gradient severe arctic stenosis with moderate to severe mitral valve regurgitation, LV enlargement  Chest x-ray consistent with pulmonary edema  proBNP 21,000  Continue gentle diuresis, Lasix 20 IV twice daily  Replace potassium  Remains bradycardic, continue off carvedilol  Continue midodrine with evidence of significant aortic valve stenosis to avoid hypotension, decrease to 5 twice daily goal blood pressure 130 systolic       Elevated troponin  High-sensitivity troponin x 2:110, 137  EKG with no acute ST or T wave segment abnormalities  Patient denies chest pain  Likely secondary to demand ischemia, strain and elevated filling pressures     Multivessel coronary artery disease  Previous remote CABG  Reported post CABG PCI  Last ischemic evaluation appears to be cardiac catheterization in 2021 with patent LIMA to the LAD,  Saphenous vein graft to the diagonal patent, saphenous vein graft to the OM patent.  30% RCA stenosis  Secondary prevention goals  Statin     Reported bradycardia  Heart rate currently mid 50s, stop carvedilol with hypotension and bradycardia     Reported hypotension, antihypertensives  carvedilol on hold, midodrine 3 times daily keep systolic greater than 110 with significant aortic valve stenosis  Needs further volume off     Mental status changes  Etiology unclear, multifactorial  Head CT with no acute findings  Patient is frail but currently appears oriented to place and arturo    Elevated creatinine, possibly secondary to hypotension and bradycardia as well as some diuresis  Blood pressure improved  Continue low-dose Lasix for the next day or so  Follow-up BMP electrolytes      Ultimately needs to follow-up with Dr. Hollingsworth and [facility] for TAVR evaluation which is already began in their system.      Further recommendations based on findings and clinical course  Naveed Morin MD, PhD      Tasha Claire, APRN  08/02/24  11:00 EDT               no

## 2024-11-11 ENCOUNTER — APPOINTMENT (OUTPATIENT)
Dept: CARDIOLOGY | Facility: CLINIC | Age: 74
End: 2024-11-11
Payer: MEDICARE

## 2024-11-11 ENCOUNTER — NON-APPOINTMENT (OUTPATIENT)
Age: 74
End: 2024-11-11

## 2024-11-11 VITALS
BODY MASS INDEX: 21.86 KG/M2 | HEIGHT: 66 IN | HEART RATE: 79 BPM | SYSTOLIC BLOOD PRESSURE: 122 MMHG | WEIGHT: 136 LBS | DIASTOLIC BLOOD PRESSURE: 76 MMHG

## 2024-11-11 DIAGNOSIS — I47.10 SUPRAVENTRICULAR TACHYCARDIA, UNSPECIFIED: ICD-10-CM

## 2024-11-11 DIAGNOSIS — I25.10 ATHEROSCLEROTIC HEART DISEASE OF NATIVE CORONARY ARTERY W/OUT ANGINA PECTORIS: ICD-10-CM

## 2024-11-11 PROCEDURE — 99214 OFFICE O/P EST MOD 30 MIN: CPT

## 2024-11-11 PROCEDURE — 93000 ELECTROCARDIOGRAM COMPLETE: CPT

## 2025-01-13 ENCOUNTER — APPOINTMENT (OUTPATIENT)
Age: 75
End: 2025-01-13
Payer: MEDICARE

## 2025-01-13 ENCOUNTER — OUTPATIENT (OUTPATIENT)
Dept: OUTPATIENT SERVICES | Facility: HOSPITAL | Age: 75
LOS: 1 days | End: 2025-01-13
Payer: MEDICARE

## 2025-01-13 ENCOUNTER — LABORATORY RESULT (OUTPATIENT)
Age: 75
End: 2025-01-13

## 2025-01-13 VITALS
BODY MASS INDEX: 22.66 KG/M2 | SYSTOLIC BLOOD PRESSURE: 129 MMHG | HEART RATE: 79 BPM | HEIGHT: 66 IN | TEMPERATURE: 98.2 F | DIASTOLIC BLOOD PRESSURE: 69 MMHG | OXYGEN SATURATION: 95 % | WEIGHT: 141 LBS | RESPIRATION RATE: 16 BRPM

## 2025-01-13 DIAGNOSIS — Z98.890 OTHER SPECIFIED POSTPROCEDURAL STATES: Chronic | ICD-10-CM

## 2025-01-13 DIAGNOSIS — D50.9 IRON DEFICIENCY ANEMIA, UNSPECIFIED: ICD-10-CM

## 2025-01-13 DIAGNOSIS — Z95.5 PRESENCE OF CORONARY ANGIOPLASTY IMPLANT AND GRAFT: Chronic | ICD-10-CM

## 2025-01-13 LAB
HCT VFR BLD CALC: 44.7 %
HGB BLD-MCNC: 14.9 G/DL
MCHC RBC-ENTMCNC: 30.2 PG
MCHC RBC-ENTMCNC: 33.3 G/DL
MCV RBC AUTO: 90.5 FL
PLATELET # BLD AUTO: 225 K/UL
PMV BLD: 9.3 FL
RBC # BLD: 4.94 M/UL
RBC # FLD: 13.3 %
WBC # FLD AUTO: 7.3 K/UL

## 2025-01-13 PROCEDURE — 85027 COMPLETE CBC AUTOMATED: CPT

## 2025-01-13 PROCEDURE — 36415 COLL VENOUS BLD VENIPUNCTURE: CPT

## 2025-01-13 PROCEDURE — 82607 VITAMIN B-12: CPT

## 2025-01-13 PROCEDURE — 82728 ASSAY OF FERRITIN: CPT

## 2025-01-13 PROCEDURE — 83550 IRON BINDING TEST: CPT

## 2025-01-13 PROCEDURE — 83540 ASSAY OF IRON: CPT

## 2025-01-13 PROCEDURE — 84443 ASSAY THYROID STIM HORMONE: CPT

## 2025-01-13 PROCEDURE — 99213 OFFICE O/P EST LOW 20 MIN: CPT

## 2025-01-13 PROCEDURE — 84439 ASSAY OF FREE THYROXINE: CPT

## 2025-01-14 DIAGNOSIS — D50.9 IRON DEFICIENCY ANEMIA, UNSPECIFIED: ICD-10-CM

## 2025-01-14 LAB
FERRITIN SERPL-MCNC: 263 NG/ML
IRON SATN MFR SERPL: 32 %
IRON SERPL-MCNC: 76 UG/DL
T4 FREE SERPL-MCNC: 1.2 NG/DL
TIBC SERPL-MCNC: 240 UG/DL
TSH SERPL-ACNC: 2.05 UIU/ML
UIBC SERPL-MCNC: 164 UG/DL
VIT B12 SERPL-MCNC: 529 PG/ML

## 2025-01-15 ENCOUNTER — OUTPATIENT (OUTPATIENT)
Dept: OUTPATIENT SERVICES | Facility: HOSPITAL | Age: 75
LOS: 1 days | End: 2025-01-15
Payer: MEDICARE

## 2025-01-15 DIAGNOSIS — Z95.5 PRESENCE OF CORONARY ANGIOPLASTY IMPLANT AND GRAFT: Chronic | ICD-10-CM

## 2025-01-15 DIAGNOSIS — Z98.890 OTHER SPECIFIED POSTPROCEDURAL STATES: Chronic | ICD-10-CM

## 2025-01-15 DIAGNOSIS — Z12.31 ENCOUNTER FOR SCREENING MAMMOGRAM FOR MALIGNANT NEOPLASM OF BREAST: ICD-10-CM

## 2025-01-15 PROCEDURE — 77067 SCR MAMMO BI INCL CAD: CPT

## 2025-01-15 PROCEDURE — 77063 BREAST TOMOSYNTHESIS BI: CPT | Mod: 26

## 2025-01-15 PROCEDURE — 77067 SCR MAMMO BI INCL CAD: CPT | Mod: 26

## 2025-01-15 PROCEDURE — 77063 BREAST TOMOSYNTHESIS BI: CPT

## 2025-01-16 DIAGNOSIS — Z12.31 ENCOUNTER FOR SCREENING MAMMOGRAM FOR MALIGNANT NEOPLASM OF BREAST: ICD-10-CM

## 2025-01-28 ENCOUNTER — RX RENEWAL (OUTPATIENT)
Age: 75
End: 2025-01-28

## 2025-02-26 ENCOUNTER — RX RENEWAL (OUTPATIENT)
Age: 75
End: 2025-02-26

## 2025-05-09 ENCOUNTER — NON-APPOINTMENT (OUTPATIENT)
Age: 75
End: 2025-05-09

## 2025-05-09 ENCOUNTER — APPOINTMENT (OUTPATIENT)
Dept: CARDIOLOGY | Facility: CLINIC | Age: 75
End: 2025-05-09
Payer: MEDICARE

## 2025-05-09 VITALS
WEIGHT: 130 LBS | BODY MASS INDEX: 20.98 KG/M2 | DIASTOLIC BLOOD PRESSURE: 82 MMHG | SYSTOLIC BLOOD PRESSURE: 138 MMHG | HEART RATE: 76 BPM

## 2025-05-09 DIAGNOSIS — I47.10 SUPRAVENTRICULAR TACHYCARDIA, UNSPECIFIED: ICD-10-CM

## 2025-05-09 DIAGNOSIS — I25.10 ATHEROSCLEROTIC HEART DISEASE OF NATIVE CORONARY ARTERY W/OUT ANGINA PECTORIS: ICD-10-CM

## 2025-05-09 PROCEDURE — 93000 ELECTROCARDIOGRAM COMPLETE: CPT

## 2025-05-09 PROCEDURE — 99214 OFFICE O/P EST MOD 30 MIN: CPT

## 2025-05-09 RX ORDER — FAMOTIDINE 10 MG/1
10 TABLET, FILM COATED ORAL
Refills: 0 | Status: ACTIVE | COMMUNITY

## 2025-07-11 ENCOUNTER — APPOINTMENT (OUTPATIENT)
Age: 75
End: 2025-07-11
Payer: MEDICARE

## 2025-07-11 VITALS
HEART RATE: 70 BPM | BODY MASS INDEX: 21.38 KG/M2 | TEMPERATURE: 98 F | RESPIRATION RATE: 16 BRPM | DIASTOLIC BLOOD PRESSURE: 64 MMHG | HEIGHT: 66 IN | WEIGHT: 133 LBS | SYSTOLIC BLOOD PRESSURE: 150 MMHG | OXYGEN SATURATION: 91 %

## 2025-07-11 LAB
AUTO BASOPHILS #: 0.07 K/UL
AUTO BASOPHILS %: 0.8 %
AUTO EOSINOPHILS #: 0.2 K/UL
AUTO EOSINOPHILS %: 2.4 %
AUTO IMMATURE GRANULOCYTES #: 0.02 K/UL
AUTO LYMPHOCYTES #: 2.24 K/UL
AUTO LYMPHOCYTES %: 27.2 %
AUTO MONOCYTES #: 0.75 K/UL
AUTO MONOCYTES %: 9.1 %
AUTO NEUTROPHILS #: 4.97 K/UL
AUTO NEUTROPHILS %: 60.3 %
AUTO NRBC #: 0 K/UL
HCT VFR BLD CALC: 42.9 %
HGB BLD-MCNC: 14.6 G/DL
IMM GRANULOCYTES NFR BLD AUTO: 0.2 %
MAN DIFF?: NORMAL
MCHC RBC-ENTMCNC: 30.9 PG
MCHC RBC-ENTMCNC: 34 G/DL
MCV RBC AUTO: 90.7 FL
PLATELET # BLD AUTO: 225 K/UL
PMV BLD AUTO: 0 /100 WBCS
PMV BLD: 9.4 FL
RBC # BLD: 4.73 M/UL
RBC # FLD: 13.1 %
WBC # FLD AUTO: 8.25 K/UL

## 2025-07-11 PROCEDURE — 99213 OFFICE O/P EST LOW 20 MIN: CPT

## 2025-07-13 LAB — FERRITIN SERPL-MCNC: 253 NG/ML
